# Patient Record
Sex: MALE | Race: WHITE | NOT HISPANIC OR LATINO | Employment: FULL TIME | URBAN - METROPOLITAN AREA
[De-identification: names, ages, dates, MRNs, and addresses within clinical notes are randomized per-mention and may not be internally consistent; named-entity substitution may affect disease eponyms.]

---

## 2017-12-07 RX ORDER — PSEUDOEPHEDRINE HCL 120 MG/1
120 TABLET, FILM COATED, EXTENDED RELEASE ORAL 2 TIMES DAILY
Qty: 60 TABLET | Refills: 1 | Status: CANCELLED | OUTPATIENT
Start: 2017-12-07

## 2018-03-08 RX ORDER — PSEUDOEPHEDRINE HCL 120 MG/1
120 TABLET, FILM COATED, EXTENDED RELEASE ORAL
Qty: 60 TABLET | Refills: 1 | Status: CANCELLED | OUTPATIENT
Start: 2018-03-01

## 2018-03-21 RX ORDER — PSEUDOEPHEDRINE HCL 120 MG/1
120 TABLET, FILM COATED, EXTENDED RELEASE ORAL
Qty: 60 TABLET | Refills: 1 | Status: CANCELLED | OUTPATIENT
Start: 2018-03-01

## 2018-04-05 ENCOUNTER — TRANSCRIBE ORDERS (OUTPATIENT)
Dept: ADMINISTRATIVE | Facility: HOSPITAL | Age: 43
End: 2018-04-05

## 2018-04-05 DIAGNOSIS — G89.29 CHRONIC LEFT SHOULDER PAIN: Primary | ICD-10-CM

## 2018-04-05 DIAGNOSIS — M25.512 CHRONIC LEFT SHOULDER PAIN: Primary | ICD-10-CM

## 2018-04-11 ENCOUNTER — HOSPITAL ENCOUNTER (OUTPATIENT)
Dept: MRI IMAGING | Facility: HOSPITAL | Age: 43
Discharge: HOME | End: 2018-04-11
Admitting: ORTHOPAEDIC SURGERY

## 2018-04-11 DIAGNOSIS — G89.29 CHRONIC LEFT SHOULDER PAIN: ICD-10-CM

## 2018-04-11 DIAGNOSIS — M25.512 CHRONIC LEFT SHOULDER PAIN: ICD-10-CM

## 2018-04-11 PROCEDURE — 73221 MRI JOINT UPR EXTREM W/O DYE: CPT

## 2018-06-27 RX ORDER — PSEUDOEPHEDRINE HCL 120 MG/1
120 TABLET, FILM COATED, EXTENDED RELEASE ORAL
Qty: 60 TABLET | Refills: 1 | Status: CANCELLED | OUTPATIENT
Start: 2018-06-27

## 2018-07-06 RX ORDER — PSEUDOEPHEDRINE HCL 120 MG/1
120 TABLET, FILM COATED, EXTENDED RELEASE ORAL
Qty: 60 TABLET | Refills: 1 | Status: CANCELLED | OUTPATIENT
Start: 2018-06-27

## 2018-07-31 RX ORDER — LISINOPRIL 20 MG/1
20 TABLET ORAL EVERY MORNING
Qty: 90 TABLET | Refills: 0 | Status: CANCELLED | OUTPATIENT
Start: 2018-07-31

## 2018-08-06 RX ORDER — PSEUDOEPHEDRINE HCL 120 MG/1
120 TABLET, FILM COATED, EXTENDED RELEASE ORAL
Qty: 60 TABLET | Refills: 1 | Status: CANCELLED | OUTPATIENT
Start: 2018-06-27

## 2018-08-06 RX ORDER — LISINOPRIL 20 MG/1
20 TABLET ORAL EVERY MORNING
Qty: 90 TABLET | Refills: 0 | Status: CANCELLED | OUTPATIENT
Start: 2018-07-31

## 2018-12-17 ENCOUNTER — OFFICE VISIT (OUTPATIENT)
Dept: INTERNAL MEDICINE | Facility: CLINIC | Age: 43
End: 2018-12-17

## 2018-12-17 VITALS
BODY MASS INDEX: 38.92 KG/M2 | WEIGHT: 248 LBS | HEIGHT: 67 IN | HEART RATE: 99 BPM | TEMPERATURE: 99.2 F | SYSTOLIC BLOOD PRESSURE: 108 MMHG | DIASTOLIC BLOOD PRESSURE: 62 MMHG | OXYGEN SATURATION: 99 %

## 2018-12-17 DIAGNOSIS — E29.1 HYPOGONADISM IN MALE: ICD-10-CM

## 2018-12-17 DIAGNOSIS — E66.01 MORBID OBESITY (HCC): ICD-10-CM

## 2018-12-17 DIAGNOSIS — I10 ESSENTIAL HYPERTENSION: ICD-10-CM

## 2018-12-17 DIAGNOSIS — Z00.00 HEALTHCARE MAINTENANCE: Primary | ICD-10-CM

## 2018-12-17 DIAGNOSIS — E55.9 VITAMIN D DEFICIENCY: ICD-10-CM

## 2018-12-17 DIAGNOSIS — Z72.51 HIGH RISK HETEROSEXUAL BEHAVIOR: ICD-10-CM

## 2018-12-17 DIAGNOSIS — R73.03 PRE-DIABETES: ICD-10-CM

## 2018-12-17 DIAGNOSIS — M54.50 CHRONIC LOW BACK PAIN WITHOUT SCIATICA, UNSPECIFIED BACK PAIN LATERALITY: ICD-10-CM

## 2018-12-17 DIAGNOSIS — Z91.09 ENVIRONMENTAL ALLERGIES: ICD-10-CM

## 2018-12-17 DIAGNOSIS — G89.29 CHRONIC LOW BACK PAIN WITHOUT SCIATICA, UNSPECIFIED BACK PAIN LATERALITY: ICD-10-CM

## 2018-12-17 DIAGNOSIS — N52.1 ERECTILE DYSFUNCTION DUE TO DISEASES CLASSIFIED ELSEWHERE: ICD-10-CM

## 2018-12-17 PROBLEM — S43.432A SUPERIOR GLENOID LABRUM LESION OF LEFT SHOULDER: Status: RESOLVED | Noted: 2018-04-12 | Resolved: 2018-12-17

## 2018-12-17 PROBLEM — M54.9 CHRONIC BACK PAIN: Status: ACTIVE | Noted: 2018-06-07

## 2018-12-17 PROBLEM — S43.432A SUPERIOR GLENOID LABRUM LESION OF LEFT SHOULDER: Status: ACTIVE | Noted: 2018-04-12

## 2018-12-17 PROCEDURE — 90715 TDAP VACCINE 7 YRS/> IM: CPT | Performed by: INTERNAL MEDICINE

## 2018-12-17 PROCEDURE — 93000 ELECTROCARDIOGRAM COMPLETE: CPT | Performed by: INTERNAL MEDICINE

## 2018-12-17 PROCEDURE — 99386 PREV VISIT NEW AGE 40-64: CPT | Performed by: INTERNAL MEDICINE

## 2018-12-17 PROCEDURE — 90471 IMMUNIZATION ADMIN: CPT | Performed by: INTERNAL MEDICINE

## 2018-12-17 NOTE — PROGRESS NOTES
No chief complaint on file.      HPI  Chris Thomas Jr. is a 43 y.o. male RTC in acute care:       ROS    {Common H&P Review Areas:65874}      Current Outpatient Medications:   •  cyclobenzaprine (FLEXERIL) 10 MG tablet, Take 1 tablet by mouth every 8 (eight) hours., Disp: 90 tablet, Rfl: 1  •  cyclobenzaprine (FLEXERIL) 10 MG tablet, Take 1 tablet by mouth 3 (Three) Times a Day., Disp: 270 tablet, Rfl: 0  •  cyclobenzaprine (FLEXERIL) 10 MG tablet, Take 1 tablet(s) twice a day by oral route before meals for 90 days., Disp: 90 tablet, Rfl: 5  •  hepatitis A (HAVRIX) 1440 EL U/ML vaccine, Inject  into the shoulder, thigh, or buttocks., Disp: 1 mL, Rfl: 0  •  Insulin Pen Needle 32G X 4 MM misc, Use as directed for victoza, Disp: 30 each, Rfl: 11  •  Liraglutide (VICTOZA) 18 MG/3ML solution pen-injector injection, Inject subcutaneously 0.6mg on 1st week, then 1.2mg on second week, and 1.8mg thereafter, Disp: 9 mL, Rfl: 1  •  Liraglutide (VICTOZA) 18 MG/3ML solution pen-injector injection, Inject 1.8 mg under the skin Daily., Disp: 9 mL, Rfl: 3  •  Liraglutide -Weight Management 18 MG/3ML solution pen-injector, Start by injecting 0.6mg for 1 week then increase by 0.6mgs every week until injecting 3mg daily, Disp: 15 mL, Rfl: 12  •  lisinopril (PRINIVIL,ZESTRIL) 20 MG tablet, Take 1 tablet by mouth Every Morning., Disp: 90 tablet, Rfl: 0  •  lisinopril (PRINIVIL,ZESTRIL) 20 MG tablet, Take 1 tablet(s) every day by oral route in the morning for 90 days., Disp: 90 tablet, Rfl: 1  •  meloxicam (MOBIC) 15 MG tablet, Take 1 tablet by mouth daily, Disp: 90 tablet, Rfl: 12  •  metFORMIN ER (GLUCOPHAGE-XR) 500 MG 24 hr tablet, Take 1 tablet(s) twice a day by oral route with meals for 90 days., Disp: 180 tablet, Rfl: 1  •  pseudoephedrine (SUDAFED 12 HOUR) 120 MG 12 hr tablet, Take 1 tablet by mouth 2 (Two) Times a Day., Disp: 60 tablet, Rfl: 1  •  pseudoephedrine (SUDAFED 12 HOUR) 120 MG 12 hr tablet, Take 1 tablet by mouth  Every 12 (Twelve) Hours with meals., Disp: 60 tablet, Rfl: 1  •  sildenafil (REVATIO) 20 MG tablet, Take 1-5 tablets by mouth As Needed., Disp: 90 tablet, Rfl: 2  •  tadalafil (CIALIS) 5 MG tablet, Take 1 tablet by mouth Daily., Disp: 30 tablet, Rfl: 6  •  testosterone (ANDROGEL) 50 MG/5GM (1%) gel gel, Place  on the skin as directed by provider. ( 1 packet every am), Disp: 150 g, Rfl: 5  •  Testosterone 20.25 MG/ACT (1.62%) gel, Place 2 Pump on the skin as directed by provider Daily., Disp: 150 g, Rfl: 5  •  vitamin D (ERGOCALCIFEROL) 83170 units capsule capsule, Take 1 capsule by mouth 1 (One) Time Per Week., Disp: 90 capsule, Rfl: 0    There were no vitals filed for this visit.      Physical Exam    Assessment/ Plan  There are no diagnoses linked to this encounter.    No Follow-up on file.      Discussion:

## 2018-12-17 NOTE — PROGRESS NOTES
"Establish Care (New Pt ) and Annual Exam (CPE )      HPI  Chris Thomas Jr. is a 43 y.o. male presents to establish, new pt CPE: Transfer from Dr. Vee  1. SARAH - dx'd in past.  Had T&A and soft palattectomy in 2005 and had negative test after surgeyr.   2. OA in L knee - had long term issues.  No meds used for knee.   3. L shoulder injury with rotator cuff \"abrasion\" - in Spring of 2018.  Placed on Meloxicam and uses PRN only.  Did some exercises and never went to formal PT. Had MRI at [place].  Home exercises really did work for pt.    4. HTN - dx'd in ~2003.  Takes med daily. Checks occasionally at home and gets good numbers 110's/ 60-70's.   5. Pre-DM/ Obesity - denies dx of DMII.  Placed on Victoza for weight loss and \"metformin was prior auth requirement\" for insurance. Has had slow, gradual weight loss. Has been going to the gym a lot \"more\" lately.  Has been stressed with divorce.  Has been eating more at home and cooking, but mother is cooking mostly. Eats fruits and vegetables, but less fruit than should. Never seen a dietician.   6. Hypogonadism - dx'd by Dr. Vee.  Pt was sx'ic with fatigue, lack of desire, low sex drive.  Has been on testosterone for last 4-5 months. Does not have a copy of those labs. Had checked fasting AM labs, but not sure of specific numbers checked.   7. ED - \"not as much lately\" but had issues in the past.  Thinks that divorce was part of issue.  Maintanence is biggest issue. Does get AM erections but not what used to get.   8. Saccral fx - ~2004, had malalignment issues with healing. Has intermittent pain issues and will take Meloxicam and flexeril for that issue PRN as well.   9. Environmental Allergies - \"I have terrible allergies\".  Tested as child and was never on shots, \"was not severe enough to be on serum\". Will use cetirizine daily and uses PRN sudaphed 3-4x/ week.  Noted tolerance to taste of Flonase was issue with taste.   Never tried other meds besides that.  Used " "Xyzal as well and noted it worked better than cetirizine, but was very expensive.   10. Vitamin D deficiency - is on once weekly, \"he never told why he put me on that\". Has been on this for last 1-2 years.         Review of Systems   Constitution: Positive for weight loss (gradual). Negative for chills, fever and malaise/fatigue.   HENT: Negative for congestion, hearing loss, odynophagia and sore throat.    Eyes: Negative for discharge, double vision, pain and redness.        Last eye exam ~2016; wears contacts     Cardiovascular: Negative for chest pain, dyspnea on exertion, irregular heartbeat, leg swelling, near-syncope, palpitations and syncope.   Respiratory: Negative for cough, shortness of breath, sleep disturbances due to breathing (resolved SARAH after surgery) and snoring (rare afer [facility] only).    Endocrine: Negative for polydipsia, polyphagia and polyuria.   Hematologic/Lymphatic: Negative for bleeding problem. Does not bruise/bleed easily.   Skin: Negative for rash and suspicious lesions.   Musculoskeletal: Positive for joint pain (L shoulder at times, mild). Negative for joint swelling, muscle cramps, muscle weakness and myalgias.   Gastrointestinal: Negative for constipation, diarrhea, dysphagia, heartburn, nausea and vomiting.   Genitourinary: Negative for dysuria, frequency, genital sores and hematuria.   Neurological: Negative for dizziness, headaches and light-headedness.   Psychiatric/Behavioral: Negative for depression. The patient is nervous/anxious (mild divorce realted anxiety, feels like handling OK ).    Allergic/Immunologic: Positive for environmental allergies. Negative for persistent infections.       Problem List:    Patient Active Problem List   Diagnosis   • Essential hypertension   • Chronic back pain   • Morbid obesity (CMS/HCC)   • Erectile dysfunction   • Vitamin D deficiency   • Environmental allergies       Medical History:    Past Medical History:   Diagnosis Date   • " Environmental allergies     tested in youth, no prior immunotherapy   • Erectile dysfunction    • Hypertension    • SARAH (obstructive sleep apnea)     resolved after uvulectomy and T & A in 2005   • Primary osteoarthritis of left knee    • Vitamin D deficiency         Social History:    Social History     Socioeconomic History   • Marital status:      Spouse name: Not on file   • Number of children: Not on file   • Years of education: Not on file   • Highest education level: Not on file   Social Needs   • Financial resource strain: Not on file   • Food insecurity - worry: Not on file   • Food insecurity - inability: Not on file   • Transportation needs - medical: Not on file   • Transportation needs - non-medical: Not on file   Occupational History   • Occupation: Pharmacy Tech     Employer: [employer]   Tobacco Use   • Smoking status: Never Smoker   • Smokeless tobacco: Never Used   Substance and Sexual Activity   • Alcohol use: Yes     Comment: 1-2 drinks/ weekly   • Drug use: No   • Sexual activity: Yes     Partners: Female     Comment: new partner, female; intermittent condoms; no STD's that aware of   Other Topics Concern   • Not on file   Social History Narrative    Diet - overall healthy, vegetables > fruit    Exercise - gym 3x/ week cardio, minimal weight/ resistance    Caffeine - off coffee; one diet soda daily       Family History:   Family History   Problem Relation Age of Onset   • Colon cancer Father         dx'd at 62   • Hypertension Father    • Alcohol abuse Maternal Uncle    • Stroke Maternal Grandfather    • Hypertension Mother    • Hypertension Sister    • Diabetes Maternal Grandmother    • Stroke Paternal Grandfather    • Lung disease Neg Hx        Surgical History:   Past Surgical History:   Procedure Laterality Date   • TONSILLECTOMY AND ADENOIDECTOMY  2005    Dr. Martinez, done for sleep apnea   • UVULECTOMY  2005    for SARAH         Current Outpatient Medications:   •   cyclobenzaprine (FLEXERIL) 10 MG tablet, Take 1 tablet(s) twice a day by oral route before meals for 90 days., Disp: 90 tablet, Rfl: 5  •  Insulin Pen Needle 32G X 4 MM misc, Use as directed for victoza, Disp: 30 each, Rfl: 11  •  Liraglutide (VICTOZA) 18 MG/3ML solution pen-injector injection, Inject 1.8 mg under the skin Daily., Disp: 9 mL, Rfl: 3  •  lisinopril (PRINIVIL,ZESTRIL) 20 MG tablet, Take 1 tablet(s) every day by oral route in the morning for 90 days., Disp: 90 tablet, Rfl: 1  •  meloxicam (MOBIC) 15 MG tablet, Take 1 tablet by mouth daily, Disp: 90 tablet, Rfl: 12  •  metFORMIN ER (GLUCOPHAGE-XR) 500 MG 24 hr tablet, Take 1 tablet(s) twice a day by oral route with meals for 90 days., Disp: 180 tablet, Rfl: 1  •  sildenafil (REVATIO) 20 MG tablet, Take 1-5 tablets by mouth As Needed., Disp: 90 tablet, Rfl: 2  •  vitamin D (ERGOCALCIFEROL) 89104 units capsule capsule, Take 1 capsule by mouth 1 (One) Time Per Week., Disp: 90 capsule, Rfl: 0    Vitals:    12/17/18 1529   BP: 108/62   Pulse: 99   Temp: 99.2 °F (37.3 °C)   SpO2: 99%       Physical Exam   Constitutional: He is oriented to person, place, and time. He appears well-developed and well-nourished. He is cooperative. No distress.   Obese habitus     HENT:   Head: Normocephalic and atraumatic.   Right Ear: Hearing, tympanic membrane, external ear and ear canal normal.   Left Ear: Hearing, tympanic membrane, external ear and ear canal normal.   Nose: Nose normal. No nasal deformity.  No foreign bodies.   Mouth/Throat: Uvula is midline, oropharynx is clear and moist and mucous membranes are normal. No oral lesions. No oropharyngeal exudate, posterior oropharyngeal edema or posterior oropharyngeal erythema.   Eyes: Conjunctivae, EOM and lids are normal. Pupils are equal, round, and reactive to light. Right eye exhibits no discharge. Left eye exhibits no discharge. No scleral icterus.   Neck: Normal range of motion and full passive range of motion  without pain. Neck supple. Carotid bruit is not present. No thyroid mass and no thyromegaly present.   Cardiovascular: Normal rate, regular rhythm, S1 normal, S2 normal and normal heart sounds. Exam reveals no gallop and no friction rub.   No murmur heard.  Pulses:       Radial pulses are 2+ on the right side, and 2+ on the left side.        Dorsalis pedis pulses are 2+ on the right side, and 2+ on the left side.        Posterior tibial pulses are 2+ on the right side, and 2+ on the left side.   Pulmonary/Chest: Effort normal and breath sounds normal. No respiratory distress. He has no wheezes. He has no rhonchi. He has no rales.   Abdominal: Soft. Bowel sounds are normal. He exhibits no distension and no mass. There is no hepatosplenomegaly. There is no tenderness. There is no rebound and no guarding.   Genitourinary: Rectum normal and prostate normal. Prostate is not enlarged and not tender.   Musculoskeletal: Normal range of motion. He exhibits no edema.        Right shoulder: He exhibits normal range of motion, no tenderness and no bony tenderness.        Left shoulder: He exhibits normal range of motion, no tenderness and no bony tenderness.   Muscular habitus     Vascular Status -  His right foot exhibits normal foot vasculature  and no edema. His left foot exhibits normal foot vasculature  and no edema.  Skin Integrity  -  His right foot skin is intact.His left foot skin is intact..  Lymphadenopathy:     He has no cervical adenopathy.     He has no axillary adenopathy.        Right: No inguinal adenopathy present.        Left: No inguinal adenopathy present.   Neurological: He is alert and oriented to person, place, and time. He has normal strength and normal reflexes. He displays no tremor. No cranial nerve deficit or sensory deficit. He exhibits normal muscle tone. Gait normal.   Skin: Skin is warm, dry and intact. No rash noted.        Full facial and chest hair.    Psychiatric: He has a normal mood and  affect. His speech is normal and behavior is normal. Thought content normal. Cognition and memory are normal.   Vitals reviewed.      ECG 12 Lead  Date/Time: 12/17/2018 5:38 PM  Performed by: Casey Cheema MD  Authorized by: Casey Cheema MD   Comparison: not compared with previous ECG   Rhythm: sinus rhythm  Rate: normal  BPM: 72  ST Segments: ST segments normal  T Waves: T waves normal  QRS axis: normal  Clinical impression: normal ECG  Comments: QTc - 396  Indication - new pt CPE            Assessment/ Plan  Diagnoses and all orders for this visit:    Healthcare maintenance  -     Tdap Vaccine Greater Than or Equal To 8yo IM  -     CBC & Differential  -     Comprehensive Metabolic Panel  -     Hemoglobin A1c  -     Hepatitis B Core Antibody, Total  -     Hepatitis B Surface Antibody  -     Hepatitis B Surface Antigen  -     HIV-1 / O / 2 Ag / Antibody 4th Generation  -     Microalbumin / Creatinine Urine Ratio - Urine, Clean Catch  -     TestT+TestF+SHBG  -     FSH & LH  -     TSH  -     UA / M With / Rflx Culture([facility] ONLY) - Urine, Clean Catch  -     Vitamin D 25 Hydroxy  -     Vitamin B12  -     Folate  -     Chlamydia trachomatis, Neisseria gonorrhoeae, PCR - Swab, Urine, Clean Catch  -     RPR    Essential hypertension  -     Comprehensive Metabolic Panel    Chronic low back pain without sciatica, unspecified back pain laterality    Morbid obesity (CMS/HCC)  -     Comprehensive Metabolic Panel  -     Hemoglobin A1c    Erectile dysfunction due to diseases classified elsewhere  -     TestT+TestF+SHBG  -     FSH & LH  -     TSH  -     Vitamin B12  -     Folate    Vitamin D deficiency  -     Vitamin D 25 Hydroxy    Environmental allergies    Pre-diabetes  -     Comprehensive Metabolic Panel  -     Hemoglobin A1c  -     TSH    Hypogonadism in male  -     TestT+TestF+SHBG  -     FSH & LH  -     TSH    High risk heterosexual behavior  -     Hepatitis B Core Antibody, Total  -     Hepatitis B Surface  "Antibody  -     Hepatitis B Surface Antigen  -     HIV-1 / O / 2 Ag / Antibody 4th Generation  -     Chlamydia trachomatis, Neisseria gonorrhoeae, PCR - Swab, Urine, Clean Catch  -     RPR        Return in about 2 weeks (around 12/31/2018).      Discussion:  Chris Thomas Jr. is a 43 y.o. male presents to establish, new pt CPE: Transfer from Dr. Vee  1. SARAH - dx'd in past, resolved on f/u sleep study after T&A and soft palattectomy in 2005.  2. OA in L knee - had long term issues.  No limitations. Monitor.   3. L shoulder injury with rotator cuff \"abrasion\" - in Spring of 2018, pt denies labral tear in shoulder as per chart.   Uses PRN Meloxicam with minimal pain issues. Non-surgical case per pt.   4. HTN - dx'd in ~2003.  Controlled on one drug. Check BMP.   5. Pre-DM/ Obesity - denies dx of DMII.  On Victoza and Metformin for 'weight loss'.  I am not in favor of this isolated indication, but will c/w meds for now until I see FBS and A1C numbers.  Would like to focus more on diet and exercise for weight loss and for DM II prevention.   6. Hypogonadism - new dx, pt on topical testosterone. I am not convinced of this dx as pt has full facial hair, chest hair, and muscular, albeit  Obese, habitus.  I would like pt to stop this med ASAP and well check baseline numbers next week, though likely will need to recheck in 2-3 months after testicular production has been given chance to recover.  PT agreeable to this plan.   7. ED - improved after divorce and feels largely psychogenic. Weight loss advised.  OK for PDE-5 PRN.   8. Saccral fx - ~2004, occasional low back pain. MAREK.  9. Environmental Allergies - long hx, testing in [facility]. No prior immunotherapy. D/C Sudaphed.  C/W cetirizine and add OTC Flonase Sensimist for tolerance issues and see how he does. Counseled.    10. Vitamin D deficiency - on high dose weekly for years?? Check levels on labs, adjust as needed.   11. HIgh risk sexual behavior - new female partner. "  Advised for condom use consistently.  Check HIV, RPR, U G/C.   12. HM - check labs; Flu/ Hep A- UTD; Tdap - today; Hep B check titers; C-scope age 47; ELIDIA OK; add more exercise.     RTC 2 weeks; next available fasting labs.

## 2018-12-21 LAB
25(OH)D3+25(OH)D2 SERPL-MCNC: 39.3 NG/ML (ref 30–100)
ALBUMIN SERPL-MCNC: 4.3 G/DL (ref 3.5–5.2)
ALBUMIN/CREAT UR: <3.3 MG/G CREAT (ref 0–30)
ALBUMIN/GLOB SERPL: 1.7 G/DL
ALP SERPL-CCNC: 73 U/L (ref 39–117)
ALT SERPL-CCNC: 28 U/L (ref 1–41)
APPEARANCE UR: CLEAR
AST SERPL-CCNC: 21 U/L (ref 1–40)
BACTERIA #/AREA URNS HPF: NORMAL /HPF
BASOPHILS # BLD AUTO: 0.01 10*3/MM3 (ref 0–0.2)
BASOPHILS NFR BLD AUTO: 0.1 % (ref 0–1.5)
BILIRUB SERPL-MCNC: 0.5 MG/DL (ref 0.1–1.2)
BILIRUB UR QL STRIP: NEGATIVE
BUN SERPL-MCNC: 13 MG/DL (ref 6–20)
BUN/CREAT SERPL: 12.7 (ref 7–25)
C TRACH RRNA SPEC QL NAA+PROBE: NEGATIVE
CALCIUM SERPL-MCNC: 9.3 MG/DL (ref 8.6–10.5)
CHLORIDE SERPL-SCNC: 103 MMOL/L (ref 98–107)
CO2 SERPL-SCNC: 26.5 MMOL/L (ref 22–29)
COLOR UR: YELLOW
CREAT SERPL-MCNC: 1.02 MG/DL (ref 0.76–1.27)
CREAT UR-MCNC: 90.3 MG/DL
EOSINOPHIL # BLD AUTO: 0.06 10*3/MM3 (ref 0–0.7)
EOSINOPHIL NFR BLD AUTO: 0.7 % (ref 0.3–6.2)
EPI CELLS #/AREA URNS HPF: NORMAL /HPF
ERYTHROCYTE [DISTWIDTH] IN BLOOD BY AUTOMATED COUNT: 13.4 % (ref 11.5–14.5)
FOLATE SERPL-MCNC: >20 NG/ML (ref 4.78–24.2)
FSH SERPL-ACNC: 4.1 MIU/ML (ref 1.5–12.4)
GLOBULIN SER CALC-MCNC: 2.6 GM/DL
GLUCOSE SERPL-MCNC: 99 MG/DL (ref 65–99)
GLUCOSE UR QL: NEGATIVE
HBA1C MFR BLD: 5.1 % (ref 4.8–5.6)
HBV CORE AB SERPL QL IA: NEGATIVE
HBV SURFACE AB SER QL: NON REACTIVE
HBV SURFACE AG SERPL QL IA: NEGATIVE
HCT VFR BLD AUTO: 44.9 % (ref 40.4–52.2)
HGB BLD-MCNC: 15.1 G/DL (ref 13.7–17.6)
HGB UR QL STRIP: NEGATIVE
HIV 1+2 AB+HIV1 P24 AG SERPL QL IA: NON REACTIVE
IMM GRANULOCYTES # BLD: 0.03 10*3/MM3 (ref 0–0.03)
IMM GRANULOCYTES NFR BLD: 0.3 % (ref 0–0.5)
KETONES UR QL STRIP: NEGATIVE
LEUKOCYTE ESTERASE UR QL STRIP: NEGATIVE
LH SERPL-ACNC: 3.8 MIU/ML (ref 1.7–8.6)
LYMPHOCYTES # BLD AUTO: 2.01 10*3/MM3 (ref 0.9–4.8)
LYMPHOCYTES NFR BLD AUTO: 23.3 % (ref 19.6–45.3)
MCH RBC QN AUTO: 29.5 PG (ref 27–32.7)
MCHC RBC AUTO-ENTMCNC: 33.6 G/DL (ref 32.6–36.4)
MCV RBC AUTO: 87.7 FL (ref 79.8–96.2)
MICRO URNS: NORMAL
MICRO URNS: NORMAL
MICROALBUMIN UR-MCNC: <3 UG/ML
MONOCYTES # BLD AUTO: 0.51 10*3/MM3 (ref 0.2–1.2)
MONOCYTES NFR BLD AUTO: 5.9 % (ref 5–12)
MUCOUS THREADS URNS QL MICRO: PRESENT /HPF
N GONORRHOEA RRNA SPEC QL NAA+PROBE: NEGATIVE
NEUTROPHILS # BLD AUTO: 6.05 10*3/MM3 (ref 1.9–8.1)
NEUTROPHILS NFR BLD AUTO: 70 % (ref 42.7–76)
NITRITE UR QL STRIP: NEGATIVE
PH UR STRIP: 6.5 [PH] (ref 5–7.5)
PLATELET # BLD AUTO: 208 10*3/MM3 (ref 140–500)
POTASSIUM SERPL-SCNC: 4.2 MMOL/L (ref 3.5–5.2)
PROT SERPL-MCNC: 6.9 G/DL (ref 6–8.5)
PROT UR QL STRIP: NEGATIVE
RBC # BLD AUTO: 5.12 10*6/MM3 (ref 4.6–6)
RBC #/AREA URNS HPF: NORMAL /HPF
RPR SER QL: NORMAL
SHBG SERPL-SCNC: 19.5 NMOL/L (ref 16.5–55.9)
SODIUM SERPL-SCNC: 139 MMOL/L (ref 136–145)
SP GR UR: 1.02 (ref 1–1.03)
TESTOST FREE SERPL-MCNC: 9.5 PG/ML (ref 6.8–21.5)
TESTOST SERPL-MCNC: 293 NG/DL (ref 264–916)
TSH SERPL DL<=0.005 MIU/L-ACNC: 3.49 MIU/ML (ref 0.27–4.2)
URINALYSIS REFLEX: NORMAL
UROBILINOGEN UR STRIP-MCNC: 0.2 MG/DL (ref 0.2–1)
VIT B12 SERPL-MCNC: 699 PG/ML (ref 211–946)
WBC # BLD AUTO: 8.64 10*3/MM3 (ref 4.5–10.7)
WBC #/AREA URNS HPF: NORMAL /HPF

## 2018-12-24 ENCOUNTER — TELEPHONE (OUTPATIENT)
Dept: INTERNAL MEDICINE | Facility: CLINIC | Age: 43
End: 2018-12-24

## 2018-12-24 NOTE — TELEPHONE ENCOUNTER
----- Message from Casey Cheema MD sent at 12/22/2018 10:12 AM EST -----  Results called to pt 12/24/18.  Labs overall look good, nothing emergent.   Will d/w pt all results in detail on 1/4/19 at visit.

## 2018-12-27 ENCOUNTER — OFFICE VISIT (OUTPATIENT)
Dept: INTERNAL MEDICINE | Facility: CLINIC | Age: 43
End: 2018-12-27

## 2018-12-27 VITALS
DIASTOLIC BLOOD PRESSURE: 62 MMHG | SYSTOLIC BLOOD PRESSURE: 112 MMHG | WEIGHT: 250 LBS | BODY MASS INDEX: 39.24 KG/M2 | TEMPERATURE: 99.7 F | OXYGEN SATURATION: 98 % | HEART RATE: 103 BPM | HEIGHT: 67 IN

## 2018-12-27 DIAGNOSIS — J01.90 ACUTE SINUSITIS, RECURRENCE NOT SPECIFIED, UNSPECIFIED LOCATION: ICD-10-CM

## 2018-12-27 DIAGNOSIS — J02.9 ACUTE PHARYNGITIS, UNSPECIFIED ETIOLOGY: Primary | ICD-10-CM

## 2018-12-27 DIAGNOSIS — R53.83 FATIGUE, UNSPECIFIED TYPE: ICD-10-CM

## 2018-12-27 LAB
EXPIRATION DATE: NORMAL
FLUAV AG NPH QL: NORMAL
FLUBV AG NPH QL: NORMAL
INTERNAL CONTROL: NORMAL
Lab: NORMAL

## 2018-12-27 PROCEDURE — 99214 OFFICE O/P EST MOD 30 MIN: CPT | Performed by: INTERNAL MEDICINE

## 2018-12-27 PROCEDURE — 87804 INFLUENZA ASSAY W/OPTIC: CPT | Performed by: INTERNAL MEDICINE

## 2018-12-27 RX ORDER — SOD CHLOR,BICARB/SQUEEZ BOTTLE
1 PACKET, WITH RINSE DEVICE NASAL 3 TIMES DAILY
Qty: 1 EACH | Refills: 0
Start: 2018-12-27 | End: 2020-01-16

## 2018-12-27 RX ORDER — IPRATROPIUM BROMIDE 21 UG/1
2 SPRAY, METERED NASAL EVERY 12 HOURS PRN
Qty: 30 ML | Refills: 0 | Status: SHIPPED | OUTPATIENT
Start: 2018-12-27 | End: 2019-06-03

## 2018-12-27 RX ORDER — FLUTICASONE PROPIONATE 50 MCG
2 SPRAY, SUSPENSION (ML) NASAL DAILY
Start: 2018-12-27 | End: 2019-01-26

## 2018-12-27 NOTE — PROGRESS NOTES
"Sinusitis; Ear Fullness; and Headache      HPI  Chris Thomas Jr. is a 43 y.o. male RTC In acute care:  Stated last PM with nasal congestion and sore throat.  Had temp to 100.5 this AM.  Minimal cough, more due to post nasal gtt.  Feels congested in sinuses, 'oh very'.  Getting some dark colored mucous from nose.  Mild body aches, \"overall achy all over\".Has blistering HA. Ears are full.  Feels run down and exhausted.   Yesterday AM felt OK, really hit pt yesterday evening.   Meds: OTC Sudpahed this AM.  No other meds used.  5AM took Aleve due to achiness and fever.        Review of Systems   Constitution: Positive for chills (last PM, \"a little bit\"/.), fever and malaise/fatigue.   HENT: Positive for congestion, ear pain, hoarse voice and sore throat. Negative for ear discharge and stridor.    Eyes: Negative for vision loss in left eye and vision loss in right eye.   Respiratory: Positive for cough (from post nasal gtt). Negative for shortness of breath and wheezing.    Musculoskeletal: Positive for myalgias (diffuse) and neck pain.   Gastrointestinal: Negative for abdominal pain, diarrhea, nausea and vomiting.   Neurological: Positive for headaches.       The following portions of the patient's history were reviewed and updated as appropriate: allergies, current medications, past medical history and problem list.      Current Outpatient Medications:   •  cyclobenzaprine (FLEXERIL) 10 MG tablet, Take 1 tablet(s) twice a day by oral route before meals for 90 days., Disp: 90 tablet, Rfl: 5  •  Insulin Pen Needle 32G X 4 MM misc, Use as directed for victoza, Disp: 30 each, Rfl: 11  •  Liraglutide (VICTOZA) 18 MG/3ML solution pen-injector injection, Inject 1.8 mg under the skin Daily., Disp: 9 mL, Rfl: 3  •  lisinopril (PRINIVIL,ZESTRIL) 20 MG tablet, Take 1 tablet(s) every day by oral route in the morning for 90 days., Disp: 90 tablet, Rfl: 1  •  meloxicam (MOBIC) 15 MG tablet, Take 1 tablet by mouth daily, Disp: 90 " "tablet, Rfl: 12  •  metFORMIN ER (GLUCOPHAGE-XR) 500 MG 24 hr tablet, Take 1 tablet(s) twice a day by oral route with meals for 90 days., Disp: 180 tablet, Rfl: 1  •  sildenafil (REVATIO) 20 MG tablet, Take 1-5 tablets by mouth As Needed., Disp: 90 tablet, Rfl: 2  •  vitamin D (ERGOCALCIFEROL) 43595 units capsule capsule, Take 1 capsule by mouth 1 (One) Time Per Week., Disp: 90 capsule, Rfl: 0  •  fluticasone (FLONASE) 50 MCG/ACT nasal spray, 2 sprays into the nostril(s) as directed by provider Daily for 30 days. Administer 2 sprays in each nostril daily, Disp: , Rfl:   •  Hypertonic Nasal Wash (SINUS RINSE BOTTLE KIT) pack, 1 bottle into the nostril(s) as directed by provider 3 (Three) Times a Day., Disp: 1 each, Rfl: 0  •  ipratropium (ATROVENT) 0.03 % nasal spray, 2 sprays into the nostril(s) as directed by provider Every 12 (Twelve) Hours As Needed for Rhinitis., Disp: 30 mL, Rfl: 0    Vitals:    12/27/18 1039   BP: 112/62   Pulse: 103   Temp: 99.7 °F (37.6 °C)   SpO2: 98%   Weight: 113 kg (250 lb)   Height: 170.2 cm (67.01\")         Physical Exam   Constitutional: He is oriented to person, place, and time. He appears well-developed and well-nourished. He does not have a sickly appearance. He does not appear ill. No distress.   HENT:   Head: Normocephalic and atraumatic.   Right Ear: Tympanic membrane, external ear and ear canal normal.   Left Ear: Tympanic membrane, external ear and ear canal normal.   Nose: Mucosal edema present. No rhinorrhea. Right sinus exhibits no maxillary sinus tenderness and no frontal sinus tenderness. Left sinus exhibits no maxillary sinus tenderness and no frontal sinus tenderness.   Mouth/Throat: Uvula is midline. Mucous membranes are not pale, not dry and not cyanotic. No oral lesions. Posterior oropharyngeal erythema (mild) present. No oropharyngeal exudate or posterior oropharyngeal edema.   Eyes: Conjunctivae are normal. Pupils are equal, round, and reactive to light. Right eye " exhibits no discharge. Left eye exhibits no discharge.   Neck: Normal range of motion. Neck supple.   Cardiovascular: Normal rate and regular rhythm.   Pulmonary/Chest: Effort normal and breath sounds normal. No respiratory distress. He has no wheezes. He has no rales.   Lymphadenopathy:     He has no cervical adenopathy.   Neurological: He is alert and oriented to person, place, and time. No cranial nerve deficit.   Psychiatric: He has a normal mood and affect. His behavior is normal.   Vitals reviewed.    Results for orders placed or performed in visit on 12/27/18   POC Influenza A / B   Result Value Ref Range    Rapid Influenza A Ag neg Negative    Rapid Influenza B Ag neg Negative    Internal Control Passed Passed    Lot Number [id number]     Expiration Date 06/30/2020        Assessment/ Plan  Diagnoses and all orders for this visit:    Acute pharyngitis, unspecified etiology  -     POC Influenza A / B  -     Hypertonic Nasal Wash (SINUS RINSE BOTTLE KIT) pack; 1 bottle into the nostril(s) as directed by provider 3 (Three) Times a Day.  -     fluticasone (FLONASE) 50 MCG/ACT nasal spray; 2 sprays into the nostril(s) as directed by provider Daily for 30 days. Administer 2 sprays in each nostril daily  -     ipratropium (ATROVENT) 0.03 % nasal spray; 2 sprays into the nostril(s) as directed by provider Every 12 (Twelve) Hours As Needed for Rhinitis.    Acute sinusitis, recurrence not specified, unspecified location  -     POC Influenza A / B  -     Hypertonic Nasal Wash (SINUS RINSE BOTTLE KIT) pack; 1 bottle into the nostril(s) as directed by provider 3 (Three) Times a Day.  -     fluticasone (FLONASE) 50 MCG/ACT nasal spray; 2 sprays into the nostril(s) as directed by provider Daily for 30 days. Administer 2 sprays in each nostril daily  -     ipratropium (ATROVENT) 0.03 % nasal spray; 2 sprays into the nostril(s) as directed by provider Every 12 (Twelve) Hours As Needed for Rhinitis.    Fatigue, unspecified  type  -     POC Influenza A / B  -     Hypertonic Nasal Wash (SINUS RINSE BOTTLE KIT) pack; 1 bottle into the nostril(s) as directed by provider 3 (Three) Times a Day.  -     fluticasone (FLONASE) 50 MCG/ACT nasal spray; 2 sprays into the nostril(s) as directed by provider Daily for 30 days. Administer 2 sprays in each nostril daily  -     ipratropium (ATROVENT) 0.03 % nasal spray; 2 sprays into the nostril(s) as directed by provider Every 12 (Twelve) Hours As Needed for Rhinitis.          Chris GIBSON William Winn is a 43 y.o. male RTC In acute care (new issue to examinter) with < 24 hours of acute sinusitis/ pharyngitis sx.  Suspect viral etiology today.  Flu swab negative in office. We reviewed cardinal signs of bacterial sinusitis and pt to monitor for development.  Tx regimen as noted above with focus on aggressive nasal toilet.  I did add Rx for ipratroprium nasal spray as pt desires aggressive tx of sinus stuffiness sx and I would like to keep pt off Sudaphed due to side effect/ blood pressure issues.  OK for use of home Meloxicam over next few days for anti-inflammatory effect.    RTC next week as planned for review of recent CPE labs. Pt to call or RTC if T> 100.5/ progressive fever curve, SOA, double sickness, or failure to improve.        Discussion:  Answers for HPI/ROS submitted by the patient on 12/27/2018   Sore throat  Chronicity: new  Onset: yesterday  Progression since onset: gradually worsening  Pain worse on: neither  Fever: 100 - 100.9 F  Fever duration: less than 1 day  Pain - numeric: 4/10  drooling: No  plugged ear sensation: Yes  swollen glands: No  trouble swallowing: Yes  strep: No  mono: No

## 2019-01-04 ENCOUNTER — OFFICE VISIT (OUTPATIENT)
Dept: INTERNAL MEDICINE | Facility: CLINIC | Age: 44
End: 2019-01-04

## 2019-01-04 VITALS
SYSTOLIC BLOOD PRESSURE: 120 MMHG | DIASTOLIC BLOOD PRESSURE: 70 MMHG | BODY MASS INDEX: 38.45 KG/M2 | WEIGHT: 245 LBS | OXYGEN SATURATION: 98 % | HEIGHT: 67 IN | TEMPERATURE: 99.6 F | HEART RATE: 91 BPM

## 2019-01-04 DIAGNOSIS — I10 ESSENTIAL (PRIMARY) HYPERTENSION: ICD-10-CM

## 2019-01-04 DIAGNOSIS — E55.9 VITAMIN D DEFICIENCY: ICD-10-CM

## 2019-01-04 DIAGNOSIS — Z91.09 ENVIRONMENTAL ALLERGIES: ICD-10-CM

## 2019-01-04 DIAGNOSIS — R05.9 COUGH: ICD-10-CM

## 2019-01-04 DIAGNOSIS — J20.9 ACUTE BRONCHITIS, UNSPECIFIED ORGANISM: Primary | ICD-10-CM

## 2019-01-04 DIAGNOSIS — N52.1 ERECTILE DYSFUNCTION DUE TO DISEASES CLASSIFIED ELSEWHERE: ICD-10-CM

## 2019-01-04 DIAGNOSIS — I10 ESSENTIAL HYPERTENSION: ICD-10-CM

## 2019-01-04 DIAGNOSIS — E66.01 MORBID OBESITY (HCC): ICD-10-CM

## 2019-01-04 DIAGNOSIS — R06.2 WHEEZING: ICD-10-CM

## 2019-01-04 PROCEDURE — 99214 OFFICE O/P EST MOD 30 MIN: CPT | Performed by: INTERNAL MEDICINE

## 2019-01-04 PROCEDURE — 94640 AIRWAY INHALATION TREATMENT: CPT | Performed by: INTERNAL MEDICINE

## 2019-01-04 RX ORDER — LISINOPRIL 20 MG/1
TABLET ORAL
Qty: 90 TABLET | Refills: 3 | Status: SHIPPED | OUTPATIENT
Start: 2019-01-04 | End: 2022-08-31

## 2019-01-04 RX ORDER — PROMETHAZINE HYDROCHLORIDE AND CODEINE PHOSPHATE 6.25; 1 MG/5ML; MG/5ML
5 SYRUP ORAL NIGHTLY PRN
Qty: 60 ML | Refills: 0 | Status: SHIPPED | OUTPATIENT
Start: 2019-01-04 | End: 2019-06-03

## 2019-01-04 RX ORDER — LEVALBUTEROL INHALATION SOLUTION 0.63 MG/3ML
0.63 SOLUTION RESPIRATORY (INHALATION) ONCE
Status: COMPLETED | OUTPATIENT
Start: 2019-01-04 | End: 2019-01-04

## 2019-01-04 RX ADMIN — LEVALBUTEROL INHALATION SOLUTION 0.63 MG: 0.63 SOLUTION RESPIRATORY (INHALATION) at 16:45

## 2019-01-04 NOTE — PROGRESS NOTES
Follow-up (new pt cpe )      HPI  Chris Thomas Jr. is a 43 y.o. male RTC In short interval f/u:   Has been dealing with nasal congestion and URI.  Had some issues with nasal rinse and ipratroprium at this point. Feels like not getting over it as much as wanted. Is not sleeping due to cough at night. Tmax was 99.4 despite some chills. Able to work, has not had to call in except for one day last week.Wants some meds for cough at night.  Is still taking Mucinex and drinking water a lot.  NO hx of asthma noted but feels like has been a bit wheezy recently at times.    Otherwise notes health has been as per recent CPE visit.   Is wondering if need to stay on 20mg of lisinopril or if can reduce dose. No home checks on blood pressure, but no low pressure sx.   Prefers not to be on more meds than needs to be, so is not committed to meds he is on for weight loss. Admits exercise is inconsistent and is the problem. Diet is overall good.       Review of Systems   Constitution: Positive for chills (intemittent) and malaise/fatigue. Negative for fever and weight loss.   HENT: Positive for congestion (improving ). Negative for sore throat.    Cardiovascular: Negative for chest pain, dyspnea on exertion, irregular heartbeat and palpitations.   Respiratory: Positive for cough, sputum production and wheezing (occasional). Negative for shortness of breath.    Neurological: Negative for dizziness and light-headedness.       The following portions of the patient's history were reviewed and updated as appropriate: allergies, current medications, past medical history and problem list.      Current Outpatient Medications:   •  cyclobenzaprine (FLEXERIL) 10 MG tablet, Take 1 tablet(s) twice a day by oral route before meals for 90 days., Disp: 90 tablet, Rfl: 5  •  fluticasone (FLONASE) 50 MCG/ACT nasal spray, 2 sprays into the nostril(s) as directed by provider Daily for 30 days. Administer 2 sprays in each nostril daily, Disp: , Rfl:  "  •  Hypertonic Nasal Wash (SINUS RINSE BOTTLE KIT) pack, 1 bottle into the nostril(s) as directed by provider 3 (Three) Times a Day., Disp: 1 each, Rfl: 0  •  Insulin Pen Needle 32G X 4 MM misc, Use as directed for victoza, Disp: 30 each, Rfl: 11  •  ipratropium (ATROVENT) 0.03 % nasal spray, 2 sprays into the nostril(s) as directed by provider Every 12 (Twelve) Hours As Needed for Rhinitis., Disp: 30 mL, Rfl: 0  •  lisinopril (PRINIVIL,ZESTRIL) 20 MG tablet, Take 1 tablet(s) every day by mouth in the morning for 90 days., Disp: 90 tablet, Rfl: 3  •  meloxicam (MOBIC) 15 MG tablet, Take 1 tablet by mouth daily, Disp: 90 tablet, Rfl: 12  •  sildenafil (REVATIO) 20 MG tablet, Take 1-5 tablets by mouth As Needed., Disp: 90 tablet, Rfl: 2  •  vitamin D (ERGOCALCIFEROL) 89955 units capsule capsule, Take 1 capsule by mouth 1 (One) Time Per Week., Disp: 90 capsule, Rfl: 0  •  promethazine-codeine (PHENERGAN with CODEINE) 6.25-10 MG/5ML syrup, Take 5 mL by mouth at night as needed for cough., Disp: 60 mL, Rfl: 0  No current facility-administered medications for this visit.     Vitals:    01/04/19 1533   BP: 120/70   Pulse: 91   Temp: 99.6 °F (37.6 °C)   SpO2: 98%   Weight: 111 kg (245 lb)   Height: 170.2 cm (67\")         Physical Exam   Constitutional: He is oriented to person, place, and time. He appears well-developed and well-nourished. No distress.   Obese habitus     HENT:   Head: Normocephalic and atraumatic.   Eyes: Pupils are equal, round, and reactive to light.   Neck: Carotid bruit is not present.   Cardiovascular: Normal rate, regular rhythm and normal heart sounds.   Pulses:       Carotid pulses are 2+ on the right side, and 2+ on the left side.       Radial pulses are 2+ on the right side, and 2+ on the left side.   Pulmonary/Chest: Effort normal. No tachypnea. No respiratory distress. He has no decreased breath sounds. He has wheezes (scattered, rare). He has no rhonchi. He has no rales.   Neurological: He is " alert and oriented to person, place, and time. No cranial nerve deficit. Gait normal.   Skin:   Thick facial hair noted.    Psychiatric: He has a normal mood and affect. His behavior is normal.   Vitals reviewed.      Assessment/ Plan  Diagnoses and all orders for this visit:    Acute bronchitis, unspecified organism  -     promethazine-codeine (PHENERGAN with CODEINE) 6.25-10 MG/5ML syrup; Take 5 mL by mouth at night as needed for cough.  -     levalbuterol (XOPENEX) nebulizer solution 0.63 mg; Take 3 mL by nebulization 1 (One) Time.    Essential (primary) hypertension  -     lisinopril (PRINIVIL,ZESTRIL) 20 MG tablet; Take 1 tablet(s) every day by mouth in the morning for 90 days.    Cough  -     promethazine-codeine (PHENERGAN with CODEINE) 6.25-10 MG/5ML syrup; Take 5 mL by mouth at night as needed for cough.  -     levalbuterol (XOPENEX) nebulizer solution 0.63 mg; Take 3 mL by nebulization 1 (One) Time.    Wheezing  -     levalbuterol (XOPENEX) nebulizer solution 0.63 mg; Take 3 mL by nebulization 1 (One) Time.    Environmental allergies    Essential hypertension    Morbid obesity (CMS/HCC)    Erectile dysfunction due to diseases classified elsewhere    Vitamin D deficiency        Return in about 5 months (around 6/4/2019) for Recheck.      Discussion:  Chris Thomas JrRamana is a 43 y.o. male RTC In short interval f/u after recent CPE:   1. Acute bronchitis and sinusitis - about 10 days into sx and is slowly improving but still dealing with cough and related insomnia and quite a bit of nasal mucous. Pt has been compliant with nasal rinsing and nasal sprays.  Exam today is notable for some mild scattered wheezes, new on exam. Peak flow normal in office today;  pt has no asthma hx despite atopic hx. Neb tx provided in office and cough syrup Rx for nighttime control.  I still suspect this is all viral in nature and pt is improving. Will call next week to f/u via phone and make sure pt is improving. Pt to track fever  curve and call if progressive or has any temp > 100.5.    2. HTN - controlled on one drug.  BMP OK. WIll hold on dose reduction and trend numbers next visit, hopefully with some weight loss.   3. Pre-DM/ Obesity - pn Victoza and Metformin for weight loss; A1C and FBS normal on labs and pt denies DM dx.  I d/w pt that I am not a fan of weight loss meds given cycle of weight loss and gain, particularly in face of less than adequate exercise. Will stop both meds today and see if pt able to reduce weight with exercise mods. Will trend A1C and FBS next labs as there is indication in pt age group for Metformin for DM II prevention, but would like to see A1C baseline first.  Pt agrees to plan.    4. Hypogonadism - new dx prior to new pt CPE her, s/p topical testosterone x 1 month.  Labs drawn one week off and show normal free testosterone/ LH/ FSH.  I do not suspect overy hypogonadism, particularly as pt does not look clinically hypogonad at all.  Will have pt remain of topical gel and will trend levels in 5 months to see how he is doing.    5. ED - improved after divorce and feels largely psychogenic. Weight loss advised.  OK for PDE-5 PRN.   6. Environmental Allergies - long hx, testing in youth. No prior immunotherapy.  C/W cetirizine and add OTC Flonase Sensimist for tolerance issues and see how he does. Counseled.    7. Vitamin D deficiency - on high dose weekly for years. Levels OK on labs, OK to c/w same.   8. HIgh risk sexual behavior - new female partner.  Advised for condom use consistently.  HIV, RPR, U G/C on labs.    9. HM -  Reviewed all labs with pt today;  Hep B check titers negative and pt will discuss vaccine series at employee health in [facility].     RTC 5 months, F labs prior (CMP, A1C, testosterone panel, LH/FSH, Vitamin D)

## 2019-03-13 RX ORDER — SILDENAFIL CITRATE 20 MG/1
20-100 TABLET ORAL AS NEEDED
Qty: 90 TABLET | Refills: 2 | Status: CANCELLED | OUTPATIENT
Start: 2019-03-13

## 2019-05-21 DIAGNOSIS — I10 ESSENTIAL HYPERTENSION: Primary | ICD-10-CM

## 2019-05-21 DIAGNOSIS — E66.01 MORBID OBESITY (HCC): ICD-10-CM

## 2019-05-22 LAB
ALBUMIN SERPL-MCNC: 4.4 G/DL (ref 3.5–5.2)
ALBUMIN/GLOB SERPL: 2 G/DL
ALP SERPL-CCNC: 77 U/L (ref 39–117)
ALT SERPL-CCNC: 24 U/L (ref 1–41)
AST SERPL-CCNC: 16 U/L (ref 1–40)
BILIRUB SERPL-MCNC: 0.5 MG/DL (ref 0.2–1.2)
BUN SERPL-MCNC: 13 MG/DL (ref 6–20)
BUN/CREAT SERPL: 14.9 (ref 7–25)
CALCIUM SERPL-MCNC: 9.4 MG/DL (ref 8.6–10.5)
CHLORIDE SERPL-SCNC: 103 MMOL/L (ref 98–107)
CO2 SERPL-SCNC: 25.9 MMOL/L (ref 22–29)
CREAT SERPL-MCNC: 0.87 MG/DL (ref 0.76–1.27)
GLOBULIN SER CALC-MCNC: 2.2 GM/DL
GLUCOSE SERPL-MCNC: 96 MG/DL (ref 65–99)
HBA1C MFR BLD: 5.2 % (ref 4.8–5.6)
POTASSIUM SERPL-SCNC: 4.2 MMOL/L (ref 3.5–5.2)
PROT SERPL-MCNC: 6.6 G/DL (ref 6–8.5)
SODIUM SERPL-SCNC: 141 MMOL/L (ref 136–145)

## 2019-06-03 ENCOUNTER — OFFICE VISIT (OUTPATIENT)
Dept: INTERNAL MEDICINE | Facility: CLINIC | Age: 44
End: 2019-06-03

## 2019-06-03 VITALS
WEIGHT: 246 LBS | DIASTOLIC BLOOD PRESSURE: 70 MMHG | OXYGEN SATURATION: 97 % | TEMPERATURE: 99.8 F | SYSTOLIC BLOOD PRESSURE: 128 MMHG | HEART RATE: 92 BPM | HEIGHT: 67 IN | BODY MASS INDEX: 38.61 KG/M2

## 2019-06-03 DIAGNOSIS — E66.01 MORBID OBESITY (HCC): ICD-10-CM

## 2019-06-03 DIAGNOSIS — Z86.69 HISTORY OF SLEEP APNEA: ICD-10-CM

## 2019-06-03 DIAGNOSIS — I10 ESSENTIAL HYPERTENSION: Primary | ICD-10-CM

## 2019-06-03 DIAGNOSIS — N52.1 ERECTILE DYSFUNCTION DUE TO DISEASES CLASSIFIED ELSEWHERE: ICD-10-CM

## 2019-06-03 DIAGNOSIS — Z91.09 ENVIRONMENTAL ALLERGIES: ICD-10-CM

## 2019-06-03 PROCEDURE — 99214 OFFICE O/P EST MOD 30 MIN: CPT | Performed by: INTERNAL MEDICINE

## 2019-06-03 RX ORDER — CETIRIZINE HYDROCHLORIDE 10 MG/1
10 TABLET ORAL AS NEEDED
COMMUNITY

## 2019-06-03 NOTE — PROGRESS NOTES
"Hypertension      HPI  Chris Thomas Jr. is a 43 y.o. male RTC in f/u: has been doing well. Had mediation for divorce today.   1. SARAH - dx'd in past, resolved on f/u sleep study after T&A and soft palattectomy in 2005.  No issues.  \"No, no.. According to my girlfirend I am not even snoring\".   2. HTN - dx'd in ~2003.  On one drug. No home checks.   3. Pre-DM/ Obesity - was  Victoza and Metformin for 'weight loss'. Diet is 'fairly good'.  Not much red meat or fatty foods. Has not lost any weight.  \"Lack of exercise\".  No exercise at this time.  Will do some swimming once weekly.  Feels like has lack of motivation. Time is biggest issue.   4. ED - improved after divorce and feels largely psychogenic.  Is doing  Better, but not ideal. Has some manitenance issues. Has sildenafil at home for PRN use.   5. Environmental Allergies - long hx, testing in youth. No prior immunotherapy. Has been using Zyrtec BID and \"it has been fine\".       Review of Systems   Constitution: Negative for malaise/fatigue and weight loss.   Cardiovascular: Negative for chest pain, dyspnea on exertion, irregular heartbeat and palpitations.   Respiratory: Negative for shortness of breath and sleep disturbances due to breathing.    Neurological: Negative for dizziness, headaches and light-headedness.       The following portions of the patient's history were reviewed and updated as appropriate: allergies, current medications, past medical history, past social history and problem list.      Current Outpatient Medications:   •  cetirizine (zyrTEC) 10 MG tablet, Take 10 mg by mouth Daily., Disp: , Rfl:   •  cyclobenzaprine (FLEXERIL) 10 MG tablet, Take 1 tablet(s) twice a day by oral route before meals for 90 days., Disp: 90 tablet, Rfl: 5  •  Hypertonic Nasal Wash (SINUS RINSE BOTTLE KIT) pack, 1 bottle into the nostril(s) as directed by provider 3 (Three) Times a Day., Disp: 1 each, Rfl: 0  •  Insulin Pen Needle 32G X 4 MM misc, Use as directed for " "victoza, Disp: 30 each, Rfl: 11  •  lisinopril (PRINIVIL,ZESTRIL) 20 MG tablet, Take 1 tablet(s) every day by mouth in the morning for 90 days., Disp: 90 tablet, Rfl: 3  •  meloxicam (MOBIC) 15 MG tablet, Take 1 tablet by mouth daily, Disp: 90 tablet, Rfl: 12  •  sildenafil (REVATIO) 20 MG tablet, Take 1-5 tablets by mouth As Needed., Disp: 90 tablet, Rfl: 2  •  vitamin D (ERGOCALCIFEROL) 83088 units capsule capsule, Take 1 capsule by mouth 1 (One) Time Per Week., Disp: 90 capsule, Rfl: 0    Vitals:    06/03/19 1541   BP: 128/70   Pulse: 92   Temp: 99.8 °F (37.7 °C)   SpO2: 97%   Weight: 112 kg (246 lb)   Height: 170.2 cm (67\")         Physical Exam   Constitutional: He is oriented to person, place, and time. He appears well-developed and well-nourished. No distress.   HENT:   Head: Normocephalic and atraumatic.   Mouth/Throat: Oropharynx is clear and moist. No oropharyngeal exudate.   Class III Mallampati     Eyes: Pupils are equal, round, and reactive to light.   Neck: Normal range of motion. Neck supple. Carotid bruit is not present.   Cardiovascular: Normal rate, regular rhythm and normal heart sounds.   Pulses:       Carotid pulses are 2+ on the right side, and 2+ on the left side.       Radial pulses are 2+ on the right side, and 2+ on the left side.   Pulmonary/Chest: Effort normal and breath sounds normal. No respiratory distress. He has no wheezes. He has no rales.   Musculoskeletal: He exhibits no edema.   Neurological: He is alert and oriented to person, place, and time. No cranial nerve deficit.   Psychiatric: He has a normal mood and affect. His behavior is normal.   Vitals reviewed.      Assessment/ Plan  Diagnoses and all orders for this visit:    Essential hypertension    Morbid obesity (CMS/HCC)    Erectile dysfunction due to diseases classified elsewhere    Environmental allergies    History of sleep apnea    Other orders  -     cetirizine (zyrTEC) 10 MG tablet; Take 10 mg by mouth " Daily.        Return in about 6 months (around 12/3/2019) for Annual physical.      Discussion:  Chris Thomas Jr. is a 43 y.o. male RTC in f/u:   1. SARAH - dx'd in past, resolved on f/u sleep study after T&A and soft palattectomy in 2005.  MAREK.  2. HTN - dx'd in ~2003.  Controlled on one drug. BMP OK.  3. Pre-DM/ Obesity - off past Victoza and Metformin for 'weight loss'. Diet isgood, but still lack of exercise. I urged pt to add more exercise to work on weight loss.  Will trend weight. FBS and A1C OK on labs.   4. ED - improved after divorce and feels largely psychogenic but still has some manitenance issues. Sildenafil PRN use. Weight loss and CV exercise advised.    5. Environmental Allergies - long hx, testing in youth. No prior immunotherapy. Controlled on Zyrtec BID for season.       RTC 6 months CPE, F labs prior

## 2020-01-10 DIAGNOSIS — I10 ESSENTIAL HYPERTENSION: ICD-10-CM

## 2020-01-10 DIAGNOSIS — E66.01 MORBID OBESITY (HCC): ICD-10-CM

## 2020-01-10 DIAGNOSIS — Z00.00 HEALTHCARE MAINTENANCE: Primary | ICD-10-CM

## 2020-01-10 DIAGNOSIS — E55.9 VITAMIN D DEFICIENCY: ICD-10-CM

## 2020-01-15 LAB
25(OH)D3+25(OH)D2 SERPL-MCNC: 29 NG/ML (ref 30–100)
ALBUMIN SERPL-MCNC: 4.2 G/DL (ref 3.5–5.2)
ALBUMIN/GLOB SERPL: 1.8 G/DL
ALP SERPL-CCNC: 72 U/L (ref 39–117)
ALT SERPL-CCNC: 28 U/L (ref 1–41)
APPEARANCE UR: CLEAR
AST SERPL-CCNC: 17 U/L (ref 1–40)
BACTERIA #/AREA URNS HPF: NORMAL /HPF
BASOPHILS # BLD AUTO: 0.01 10*3/MM3 (ref 0–0.2)
BASOPHILS NFR BLD AUTO: 0.1 % (ref 0–1.5)
BILIRUB SERPL-MCNC: 0.3 MG/DL (ref 0.2–1.2)
BILIRUB UR QL STRIP: NEGATIVE
BUN SERPL-MCNC: 15 MG/DL (ref 6–20)
BUN/CREAT SERPL: 14.7 (ref 7–25)
CALCIUM SERPL-MCNC: 9 MG/DL (ref 8.6–10.5)
CHLORIDE SERPL-SCNC: 103 MMOL/L (ref 98–107)
CHOLEST SERPL-MCNC: 146 MG/DL (ref 0–200)
CO2 SERPL-SCNC: 23.7 MMOL/L (ref 22–29)
COLOR UR: YELLOW
CREAT SERPL-MCNC: 1.02 MG/DL (ref 0.76–1.27)
EOSINOPHIL # BLD AUTO: 0.07 10*3/MM3 (ref 0–0.4)
EOSINOPHIL NFR BLD AUTO: 1 % (ref 0.3–6.2)
EPI CELLS #/AREA URNS HPF: NORMAL /HPF (ref 0–10)
ERYTHROCYTE [DISTWIDTH] IN BLOOD BY AUTOMATED COUNT: 13.6 % (ref 12.3–15.4)
GLOBULIN SER CALC-MCNC: 2.4 GM/DL
GLUCOSE SERPL-MCNC: 88 MG/DL (ref 65–99)
GLUCOSE UR QL: NEGATIVE
HBA1C MFR BLD: 5.5 % (ref 4.8–5.6)
HCT VFR BLD AUTO: 42.4 % (ref 37.5–51)
HDLC SERPL-MCNC: 36 MG/DL (ref 40–60)
HGB BLD-MCNC: 14.5 G/DL (ref 13–17.7)
HGB UR QL STRIP: NEGATIVE
IMM GRANULOCYTES # BLD AUTO: 0.01 10*3/MM3 (ref 0–0.05)
IMM GRANULOCYTES NFR BLD AUTO: 0.1 % (ref 0–0.5)
KETONES UR QL STRIP: NEGATIVE
LDLC SERPL CALC-MCNC: 90 MG/DL (ref 0–100)
LEUKOCYTE ESTERASE UR QL STRIP: NEGATIVE
LYMPHOCYTES # BLD AUTO: 2.11 10*3/MM3 (ref 0.7–3.1)
LYMPHOCYTES NFR BLD AUTO: 29.3 % (ref 19.6–45.3)
MCH RBC QN AUTO: 29.3 PG (ref 26.6–33)
MCHC RBC AUTO-ENTMCNC: 34.2 G/DL (ref 31.5–35.7)
MCV RBC AUTO: 85.7 FL (ref 79–97)
MICRO URNS: NORMAL
MICRO URNS: NORMAL
MONOCYTES # BLD AUTO: 0.46 10*3/MM3 (ref 0.1–0.9)
MONOCYTES NFR BLD AUTO: 6.4 % (ref 5–12)
NEUTROPHILS # BLD AUTO: 4.54 10*3/MM3 (ref 1.7–7)
NEUTROPHILS NFR BLD AUTO: 63.1 % (ref 42.7–76)
NITRITE UR QL STRIP: NEGATIVE
NRBC BLD AUTO-RTO: 0 /100 WBC (ref 0–0.2)
PH UR STRIP: 7 [PH] (ref 5–7.5)
PLATELET # BLD AUTO: 203 10*3/MM3 (ref 140–450)
POTASSIUM SERPL-SCNC: 4 MMOL/L (ref 3.5–5.2)
PROT SERPL-MCNC: 6.6 G/DL (ref 6–8.5)
PROT UR QL STRIP: NEGATIVE
RBC # BLD AUTO: 4.95 10*6/MM3 (ref 4.14–5.8)
RBC #/AREA URNS HPF: NORMAL /HPF (ref 0–2)
SODIUM SERPL-SCNC: 140 MMOL/L (ref 136–145)
SP GR UR: 1.01 (ref 1–1.03)
T4 FREE SERPL-MCNC: 0.87 NG/DL (ref 0.93–1.7)
TRIGL SERPL-MCNC: 99 MG/DL (ref 0–150)
TSH SERPL DL<=0.005 MIU/L-ACNC: 4.96 UIU/ML (ref 0.27–4.2)
URINALYSIS REFLEX: NORMAL
UROBILINOGEN UR STRIP-MCNC: 0.2 MG/DL (ref 0.2–1)
VLDLC SERPL CALC-MCNC: 19.8 MG/DL
WBC # BLD AUTO: 7.2 10*3/MM3 (ref 3.4–10.8)
WBC #/AREA URNS HPF: NORMAL /HPF (ref 0–5)

## 2020-01-16 ENCOUNTER — OFFICE VISIT (OUTPATIENT)
Dept: INTERNAL MEDICINE | Facility: CLINIC | Age: 45
End: 2020-01-16

## 2020-01-16 VITALS
HEIGHT: 67 IN | TEMPERATURE: 99.2 F | OXYGEN SATURATION: 98 % | HEART RATE: 75 BPM | DIASTOLIC BLOOD PRESSURE: 88 MMHG | SYSTOLIC BLOOD PRESSURE: 128 MMHG | BODY MASS INDEX: 39.24 KG/M2 | WEIGHT: 250 LBS | RESPIRATION RATE: 12 BRPM

## 2020-01-16 DIAGNOSIS — E66.9 OBESITY (BMI 30-39.9): ICD-10-CM

## 2020-01-16 DIAGNOSIS — N52.1 ERECTILE DYSFUNCTION DUE TO DISEASES CLASSIFIED ELSEWHERE: ICD-10-CM

## 2020-01-16 DIAGNOSIS — I10 ESSENTIAL HYPERTENSION: ICD-10-CM

## 2020-01-16 DIAGNOSIS — Z00.00 HEALTHCARE MAINTENANCE: Primary | ICD-10-CM

## 2020-01-16 DIAGNOSIS — M17.12 PRIMARY OSTEOARTHRITIS OF LEFT KNEE: ICD-10-CM

## 2020-01-16 DIAGNOSIS — Z91.09 ENVIRONMENTAL ALLERGIES: ICD-10-CM

## 2020-01-16 DIAGNOSIS — E55.9 VITAMIN D DEFICIENCY: ICD-10-CM

## 2020-01-16 PROBLEM — E78.5 DYSLIPIDEMIA: Status: ACTIVE | Noted: 2020-01-16

## 2020-01-16 PROCEDURE — 99396 PREV VISIT EST AGE 40-64: CPT | Performed by: INTERNAL MEDICINE

## 2020-01-16 NOTE — PROGRESS NOTES
"Annual Exam (review of medical issues )      HPI  Chris Thomas Jr. is a 44 y.o. male RTC in yearly CPE, review of medical issues:  Has been doing well.  Had some viral URI a few months ago, saw [facility], and dx'd with \"upper respiratory... Z-pack and some cough medicine and it knocked it right out\". Has new son last month and so sleep has been lacking a bit.   1. SARAH - dx'd in past, resolved on f/u sleep study after T&A and soft palattectomy in 2005. Has some rare snoring.  No other issues have recurred.   2. HTN - dx'd in ~2003. No home checks recently.  In past has been \"right where it needs to be\".   3. Pre-DM/ Obesity - off past Victoza and Metformin for 'weight loss'.  Weight has been stable overall, but \"has not been taking any extreme measures to loose weight'. Is vegetarian \"2/3rds of the time\" as girlfriend is vegetarian.  Feels like diet overall much better. No exercise right now.   4. ED - improved after divorce and feels largely psychogenic but still has some manitenance issues. No issues.     5. Environmental Allergies - long hx, testing in youth. No issues recently.  Is on Zyrtec daily.  Will miss med some time when able and restarts when gets stuffy again.  6. Vitamin D deficiency - on high dose weekly for years.     Review of Systems   Constitution: Negative for chills, fever, malaise/fatigue, weight gain and weight loss.   HENT: Negative for congestion, hearing loss, hoarse voice, odynophagia and sore throat.    Eyes: Negative for discharge, double vision, pain and redness.        Last eye exam 1/2019; wears contacts     Cardiovascular: Negative for chest pain, dyspnea on exertion, irregular heartbeat, leg swelling, near-syncope, palpitations and syncope.   Respiratory: Negative for cough, shortness of breath, sleep disturbances due to breathing and snoring.    Endocrine: Negative for polydipsia, polyphagia and polyuria.   Hematologic/Lymphatic: Negative for bleeding problem. Does not bruise/bleed " easily.   Musculoskeletal: Negative for joint pain, joint swelling, muscle cramps, muscle weakness and myalgias.   Gastrointestinal: Negative for constipation, diarrhea, dysphagia, heartburn, nausea and vomiting.   Genitourinary: Negative for dysuria, frequency, hematuria and hesitancy.   Neurological: Negative for dizziness, headaches and light-headedness.   Psychiatric/Behavioral: Negative for depression. The patient does not have insomnia and is not nervous/anxious.        Problem List:    Patient Active Problem List   Diagnosis   • Essential hypertension   • Chronic back pain   • Obesity (BMI 30-39.9)   • Erectile dysfunction   • Vitamin D deficiency   • Environmental allergies   • Primary osteoarthritis of left knee       Medical History:    Past Medical History:   Diagnosis Date   • Environmental allergies     tested in youth, no prior immunotherapy   • Erectile dysfunction    • Hypertension    • SARAH (obstructive sleep apnea)     resolved after uvulectomy and T & A in 2005   • Primary osteoarthritis of left knee    • Vitamin D deficiency         Social History:    Social History     Socioeconomic History   • Marital status:      Spouse name: Not on file   • Number of children: Not on file   • Years of education: Not on file   • Highest education level: Not on file   Occupational History   • Occupation: Pharmacy Tech     Employer: [employer]   Tobacco Use   • Smoking status: Never Smoker   • Smokeless tobacco: Never Used   Substance and Sexual Activity   • Alcohol use: Yes     Comment: 1-2 drinks/ week - month   • Drug use: No   • Sexual activity: Yes     Partners: Female     Comment: new partner, female; intermittent condoms; no STD's that aware of   Lifestyle   • Physical activity:     Days per week: 0 days     Minutes per session: 0 min   • Stress: Not on file   Social History Narrative    Diet - overall healthy, vegetables > fruit    Exercise - gym 3x/ week cardio, minimal weight/  resistance; none in 2020    Caffeine - off coffee; one diet soda daily       Family History:   Family History   Problem Relation Age of Onset   • Colon cancer Father         dx'd at 62   • Hypertension Father    • Alcohol abuse Maternal Uncle    • Stroke Maternal Grandfather    • Hypertension Mother    • Hypertension Sister    • Diabetes Maternal Grandmother    • Stroke Paternal Grandfather    • No Known Problems Son    • Lung disease Neg Hx        Surgical History:   Past Surgical History:   Procedure Laterality Date   • TONSILLECTOMY AND ADENOIDECTOMY  2005    Dr. Martinez, done for sleep apnea   • UVULECTOMY  2005    for SARAH         Current Outpatient Medications:   •  cetirizine (zyrTEC) 10 MG tablet, Take 10 mg by mouth Daily., Disp: , Rfl:   •  lisinopril (PRINIVIL,ZESTRIL) 20 MG tablet, Take 1 tablet(s) every day by mouth in the morning for 90 days., Disp: 90 tablet, Rfl: 3  •  vitamin D (ERGOCALCIFEROL) 75032 units capsule capsule, Take 1 capsule by mouth 1 (One) Time Per Week., Disp: 90 capsule, Rfl: 0  •  meloxicam (MOBIC) 15 MG tablet, Take 1 tablet by mouth daily, Disp: 90 tablet, Rfl: 12  •  sildenafil (REVATIO) 20 MG tablet, Take 1-5 tablets by mouth As Needed., Disp: 90 tablet, Rfl: 2    Vitals:    01/16/20 1517   BP: 128/88   Pulse: 75   Resp: 12   Temp: 99.2 °F (37.3 °C)   SpO2: 98%     Body mass index is 39.16 kg/m².    Physical Exam   Constitutional: He is oriented to person, place, and time. He appears well-developed and well-nourished. He is cooperative. No distress.   Obese habitus     HENT:   Head: Normocephalic and atraumatic.   Right Ear: Hearing, tympanic membrane, external ear and ear canal normal.   Left Ear: Hearing, tympanic membrane, external ear and ear canal normal.   Nose: Nose normal. No mucosal edema or rhinorrhea.   Mouth/Throat: Uvula is midline, oropharynx is clear and moist and mucous membranes are normal. No oral lesions. No uvula swelling. No oropharyngeal exudate, posterior  oropharyngeal edema or posterior oropharyngeal erythema.   Eyes: Pupils are equal, round, and reactive to light. Conjunctivae, EOM and lids are normal. Right eye exhibits no discharge. Left eye exhibits no discharge. No scleral icterus.   Neck: Normal range of motion and full passive range of motion without pain. Neck supple. Carotid bruit is not present. No thyroid mass and no thyromegaly present.   Cardiovascular: Normal rate, regular rhythm, S1 normal, S2 normal and normal heart sounds. Exam reveals no gallop and no friction rub.   No murmur heard.  Pulses:       Radial pulses are 2+ on the right side, and 2+ on the left side.        Dorsalis pedis pulses are 2+ on the right side, and 2+ on the left side.        Posterior tibial pulses are 2+ on the right side, and 2+ on the left side.   Pulmonary/Chest: Effort normal and breath sounds normal. No respiratory distress. He has no wheezes. He has no rhonchi. He has no rales.   Abdominal: Soft. Bowel sounds are normal. He exhibits no distension and no mass. There is no hepatosplenomegaly. There is no tenderness. There is no rebound and no guarding.   Genitourinary: Rectum normal and prostate normal. Prostate is not enlarged and not tender.   Musculoskeletal: Normal range of motion. He exhibits no edema.     Vascular Status -  His right foot exhibits normal foot vasculature  and no edema. His left foot exhibits normal foot vasculature  and no edema.  Skin Integrity  -  His right foot skin is intact.His left foot skin is intact..  Lymphadenopathy:     He has no cervical adenopathy.     He has no axillary adenopathy.        Right: No inguinal adenopathy present.        Left: No inguinal adenopathy present.   Neurological: He is alert and oriented to person, place, and time. He has normal strength and normal reflexes. He displays no tremor. No cranial nerve deficit or sensory deficit. He exhibits normal muscle tone. Gait normal.   Skin: Skin is warm, dry and intact. No  "rash noted.   Psychiatric: He has a normal mood and affect. His speech is normal and behavior is normal. Thought content normal. Cognition and memory are normal.   Vitals reviewed.      Assessment/ Plan  Diagnoses and all orders for this visit:    Healthcare maintenance    Essential hypertension    Obesity (BMI 30-39.9)    Vitamin D deficiency    Environmental allergies    Erectile dysfunction due to diseases classified elsewhere    Primary osteoarthritis of left knee        Return in about 1 year (around 1/16/2021) for Annual physical.      Discussion:  Chris Thomas Jr. is a 44 y.o. male RTC in yearly CPE, review of medical issues:    1. SARAH - dx'd in past, resolved on f/u sleep study after T&A and soft palattectomy in 2005.  MAREK.  2. HTN - dx'd in ~2003.  Controlled on one drug. BMP OK.  3. Hx of Pre-DM/ Obesity - off past Victoza and Metformin for 'weight loss'. Diet improved and mostly vegetarian. Needs to add exercise.  FBS and A1C OK on labs.   4. ED - improved after divorce and feels largely psychogenic. Sildenafil PRN use. Weight loss and CV exercise advised.    5. Environmental Allergies - long hx, testing in youth. No prior immunotherapy. Controlled on Zyrtec daily.   6. OA in L knee - MAREK. Rare NSAID use.   7. L shoulder injury with rotator cuff \"abrasion\" - in Spring of 2018. PRN Meloxicam with minimal pain issues. Non-surgical case per pt.   8. Hx of Hypogonadism - off topical testosterone. Repeat labs in 2018 showed normal testosterone Pt has robust body hair/ normal muscle mass on exam.  No tx advised.   9. Vitamin D deficiency - near replete on high dose weekly. C/W same.   10. Elevated TSH - noted on labs with low T4. Will recheck in 2-3 weeks with anti-TPO Ab. Discuss tx if needed after labs.   11. HM - labs d/w pt; Flu/ Hep A/ Tdap -UTD; Hep B serologies (-) in past, address with employee health; C-scope age 47; ELIDIA OK; add more exercise; weight loss advised.     RTC 1 year CPE, F labs prior  NF " labs in 3 weeks (TSH, fT4, anti-TPO Ab)

## 2020-01-24 DIAGNOSIS — R79.89 ELEVATED TSH: Primary | ICD-10-CM

## 2020-01-31 PROBLEM — R79.89 ELEVATED TSH: Status: ACTIVE | Noted: 2020-01-31

## 2020-01-31 LAB
T4 FREE SERPL-MCNC: 0.99 NG/DL (ref 0.93–1.7)
THYROPEROXIDASE AB SERPL-ACNC: 6 IU/ML (ref 0–34)
TSH SERPL DL<=0.005 MIU/L-ACNC: 4.98 UIU/ML (ref 0.27–4.2)

## 2020-07-08 DIAGNOSIS — R79.89 ELEVATED TSH: Primary | ICD-10-CM

## 2020-07-14 ENCOUNTER — TELEPHONE (OUTPATIENT)
Dept: INTERNAL MEDICINE | Facility: CLINIC | Age: 45
End: 2020-07-14

## 2020-07-14 NOTE — TELEPHONE ENCOUNTER
PATIENT STATES HE HAS ANOTHER APPOINTMENT THAT INTERFERES WITH TODAYS APPOINTMENT FOR LAB WORK       HE IS REQUESTING TO CANCEL FOR TODAYS LABS AND TO CALL BACK WITH ANOTHER APPOINTMENT TIME   ANY DAY BUT Wednesday AND THE FIRST MORNING APPOINTMENT AVAILABLE     GOOD CONTACT NUMBER   [phone] (H)

## 2021-01-01 ENCOUNTER — IMMUNIZATION (OUTPATIENT)
Dept: VACCINE CLINIC | Facility: HOSPITAL | Age: 46
End: 2021-01-01

## 2021-01-01 PROCEDURE — 91300 HC SARSCOV02 VAC 30MCG/0.3ML IM: CPT | Performed by: INTERNAL MEDICINE

## 2021-01-01 PROCEDURE — 0001A: CPT | Performed by: INTERNAL MEDICINE

## 2021-01-05 DIAGNOSIS — Z11.59 NEED FOR HEPATITIS C SCREENING TEST: ICD-10-CM

## 2021-01-05 DIAGNOSIS — E55.9 VITAMIN D DEFICIENCY: ICD-10-CM

## 2021-01-05 DIAGNOSIS — E66.9 OBESITY (BMI 30-39.9): ICD-10-CM

## 2021-01-05 DIAGNOSIS — E78.5 DYSLIPIDEMIA: ICD-10-CM

## 2021-01-05 DIAGNOSIS — R79.89 ELEVATED TSH: ICD-10-CM

## 2021-01-05 DIAGNOSIS — Z00.00 HEALTHCARE MAINTENANCE: Primary | ICD-10-CM

## 2021-01-05 DIAGNOSIS — I10 ESSENTIAL HYPERTENSION: ICD-10-CM

## 2021-01-22 ENCOUNTER — IMMUNIZATION (OUTPATIENT)
Dept: VACCINE CLINIC | Facility: HOSPITAL | Age: 46
End: 2021-01-22

## 2021-01-22 PROCEDURE — 0002A: CPT | Performed by: INTERNAL MEDICINE

## 2021-01-22 PROCEDURE — 91300 HC SARSCOV02 VAC 30MCG/0.3ML IM: CPT | Performed by: INTERNAL MEDICINE

## 2022-08-29 ENCOUNTER — TELEPHONE (OUTPATIENT)
Dept: FAMILY MEDICINE CLINIC | Facility: CLINIC | Age: 47
End: 2022-08-29

## 2022-08-29 NOTE — TELEPHONE ENCOUNTER
Caller: Chris Thomas Jr.    Relationship to patient: Self    Best call back number: [phone]    Chief complaint: PATIENT HAS EARACHE AND WANTS TO BE SEEN SOONER BUT HUB UNABLE O WARM TRANSFER    Type of visit: SAME DAY/OFFICE VISIT    Requested date: 8/29/22 OR 8/31/22 AROUND 1 PM    If rescheduling, when is the original appointment:9/2/22    Additional notes:PATIENT CALLED EXPERIENCING EARACHE OF LEFT EAR AND SCHEDULED AN  APPOINTMENT ON  9/2/22 BUT EXPRESSED HE WANTED TO BE SEEN SOONER.HUB WAS UNABLE TO WARM TRANSFER SO PATIENT REQUESTING TO KNOW IF HE CAN  BE SEEN TODAY 8/29/22 OR 8/31/22 AROUND 1 PM.REQUESTING A CALLBACK FOR UPDATES.

## 2022-08-31 ENCOUNTER — OFFICE VISIT (OUTPATIENT)
Dept: FAMILY MEDICINE CLINIC | Facility: CLINIC | Age: 47
End: 2022-08-31

## 2022-08-31 VITALS
SYSTOLIC BLOOD PRESSURE: 137 MMHG | OXYGEN SATURATION: 98 % | HEART RATE: 85 BPM | HEIGHT: 67 IN | DIASTOLIC BLOOD PRESSURE: 88 MMHG | WEIGHT: 254.4 LBS | BODY MASS INDEX: 39.93 KG/M2 | TEMPERATURE: 98.7 F

## 2022-08-31 DIAGNOSIS — Z13.29 SCREENING FOR THYROID DISORDER: ICD-10-CM

## 2022-08-31 DIAGNOSIS — G89.29 CHRONIC LOW BACK PAIN WITHOUT SCIATICA, UNSPECIFIED BACK PAIN LATERALITY: ICD-10-CM

## 2022-08-31 DIAGNOSIS — E66.9 OBESITY (BMI 30-39.9): ICD-10-CM

## 2022-08-31 DIAGNOSIS — N52.1 ERECTILE DYSFUNCTION DUE TO DISEASES CLASSIFIED ELSEWHERE: ICD-10-CM

## 2022-08-31 DIAGNOSIS — I10 ESSENTIAL HYPERTENSION: ICD-10-CM

## 2022-08-31 DIAGNOSIS — Z76.89 ENCOUNTER TO ESTABLISH CARE: ICD-10-CM

## 2022-08-31 DIAGNOSIS — Z13.220 SCREENING FOR LIPID DISORDERS: ICD-10-CM

## 2022-08-31 DIAGNOSIS — M17.12 PRIMARY OSTEOARTHRITIS OF LEFT KNEE: ICD-10-CM

## 2022-08-31 DIAGNOSIS — E78.5 DYSLIPIDEMIA: ICD-10-CM

## 2022-08-31 DIAGNOSIS — Z00.00 ANNUAL PHYSICAL EXAM: ICD-10-CM

## 2022-08-31 DIAGNOSIS — Z13.1 SCREENING FOR DIABETES MELLITUS: ICD-10-CM

## 2022-08-31 DIAGNOSIS — R79.89 ELEVATED TSH: ICD-10-CM

## 2022-08-31 DIAGNOSIS — Z91.09 ENVIRONMENTAL ALLERGIES: ICD-10-CM

## 2022-08-31 DIAGNOSIS — Z13.29 SCREENING FOR ENDOCRINE DISORDER: ICD-10-CM

## 2022-08-31 DIAGNOSIS — H65.192 OTHER NON-RECURRENT ACUTE NONSUPPURATIVE OTITIS MEDIA OF LEFT EAR: Primary | ICD-10-CM

## 2022-08-31 DIAGNOSIS — M54.50 CHRONIC LOW BACK PAIN WITHOUT SCIATICA, UNSPECIFIED BACK PAIN LATERALITY: ICD-10-CM

## 2022-08-31 DIAGNOSIS — Z11.59 ENCOUNTER FOR HEPATITIS C SCREENING TEST FOR LOW RISK PATIENT: ICD-10-CM

## 2022-08-31 PROCEDURE — 99215 OFFICE O/P EST HI 40 MIN: CPT | Performed by: REGISTERED NURSE

## 2022-08-31 RX ORDER — DOXYCYCLINE HYCLATE 100 MG/1
100 CAPSULE ORAL 2 TIMES DAILY
Qty: 20 CAPSULE | Refills: 0 | Status: SHIPPED | OUTPATIENT
Start: 2022-08-31 | End: 2023-01-31

## 2023-01-30 ENCOUNTER — TELEPHONE (OUTPATIENT)
Dept: FAMILY MEDICINE CLINIC | Facility: CLINIC | Age: 48
End: 2023-01-30
Payer: COMMERCIAL

## 2023-01-30 NOTE — TELEPHONE ENCOUNTER
Caller: Chris Thomas Jr.    Relationship to patient: Self    Best call back number: [phone]    Chief complaint: PASSED OUT COUGHING    Patient directed to call 911 or go to their nearest emergency room.     Patient verbalized understanding: [] Yes  [x] No  If no, why?    Additional notes:ATTEMPTED A WARM TRANSFER, UNSUCCESSFUL.    PATIENT STATED CANNOT GO TO ER.      Caller: Chris Thomas Jr.    Relationship to patient: Self    Best call back number: [phone]    Chief complaint: SEVERE COUGH, PASSED OUT COUGHING REALLY HARD, DRAINAGE, CONGESTION    Type of visit: SAME DAY    Requested date: 01/30    If rescheduling, when is the original appointment: N/A    Additional notes:THERE WERE NOT ANY SAME DAY APPTS.  FIRST AVAILABLE WAS FEB 28.    ADVISED ER, PATIENT REFUSED.

## 2023-01-30 NOTE — TELEPHONE ENCOUNTER
Called and spoke to patient. Informed that we do not have any same day appointments with [facility]. I scheduled patient for next day office visit with Luis Gonzalez on 1/31/23 at 9:45am.

## 2023-01-31 ENCOUNTER — OFFICE VISIT (OUTPATIENT)
Dept: FAMILY MEDICINE CLINIC | Age: 48
End: 2023-01-31
Payer: COMMERCIAL

## 2023-01-31 ENCOUNTER — HOSPITAL ENCOUNTER (OUTPATIENT)
Dept: GENERAL RADIOLOGY | Facility: HOSPITAL | Age: 48
Discharge: HOME | End: 2023-01-31
Payer: COMMERCIAL

## 2023-01-31 ENCOUNTER — HOSPITAL ENCOUNTER (OUTPATIENT)
Dept: CARDIOLOGY | Facility: HOSPITAL | Age: 48
Discharge: HOME | End: 2023-01-31
Payer: COMMERCIAL

## 2023-01-31 VITALS
HEIGHT: 67 IN | BODY MASS INDEX: 40.56 KG/M2 | SYSTOLIC BLOOD PRESSURE: 140 MMHG | OXYGEN SATURATION: 98 % | RESPIRATION RATE: 18 BRPM | WEIGHT: 258.4 LBS | DIASTOLIC BLOOD PRESSURE: 82 MMHG | TEMPERATURE: 97.8 F | HEART RATE: 83 BPM

## 2023-01-31 DIAGNOSIS — M25.551 RIGHT HIP PAIN: ICD-10-CM

## 2023-01-31 DIAGNOSIS — R55 SYNCOPE, UNSPECIFIED SYNCOPE TYPE: ICD-10-CM

## 2023-01-31 DIAGNOSIS — R05.1 ACUTE COUGH: Primary | ICD-10-CM

## 2023-01-31 DIAGNOSIS — M16.11 OSTEOARTHRITIS OF RIGHT HIP, UNSPECIFIED OSTEOARTHRITIS TYPE: ICD-10-CM

## 2023-01-31 DIAGNOSIS — J20.9 ACUTE BRONCHITIS, UNSPECIFIED ORGANISM: ICD-10-CM

## 2023-01-31 DIAGNOSIS — R05.1 ACUTE COUGH: ICD-10-CM

## 2023-01-31 DIAGNOSIS — J98.01 BRONCHOSPASM: ICD-10-CM

## 2023-01-31 LAB
EXPIRATION DATE: NORMAL
FLUAV AG UPPER RESP QL IA.RAPID: NOT DETECTED
FLUBV AG UPPER RESP QL IA.RAPID: NOT DETECTED
INTERNAL CONTROL: NORMAL
Lab: NORMAL
SARS-COV-2 AG UPPER RESP QL IA.RAPID: NOT DETECTED

## 2023-01-31 PROCEDURE — 71046 X-RAY EXAM CHEST 2 VIEWS: CPT

## 2023-01-31 PROCEDURE — 93005 ELECTROCARDIOGRAM TRACING: CPT | Performed by: NURSE PRACTITIONER

## 2023-01-31 PROCEDURE — 99214 OFFICE O/P EST MOD 30 MIN: CPT | Performed by: NURSE PRACTITIONER

## 2023-01-31 PROCEDURE — 73502 X-RAY EXAM HIP UNI 2-3 VIEWS: CPT

## 2023-01-31 PROCEDURE — 93010 ELECTROCARDIOGRAM REPORT: CPT | Performed by: INTERNAL MEDICINE

## 2023-01-31 PROCEDURE — 87428 SARSCOV & INF VIR A&B AG IA: CPT | Performed by: NURSE PRACTITIONER

## 2023-01-31 RX ORDER — ALBUTEROL SULFATE 90 UG/1
2 AEROSOL, METERED RESPIRATORY (INHALATION) EVERY 4 HOURS PRN
Qty: 8 G | Refills: 0 | Status: SHIPPED | OUTPATIENT
Start: 2023-01-31

## 2023-01-31 RX ORDER — BENZONATATE 200 MG/1
200 CAPSULE ORAL 3 TIMES DAILY PRN
Qty: 30 CAPSULE | Refills: 0 | Status: SHIPPED | OUTPATIENT
Start: 2023-01-31 | End: 2023-02-10

## 2023-01-31 RX ORDER — DOXYCYCLINE HYCLATE 100 MG/1
100 CAPSULE ORAL 2 TIMES DAILY
Qty: 20 CAPSULE | Refills: 0 | Status: SHIPPED | OUTPATIENT
Start: 2023-01-31 | End: 2023-02-10

## 2023-01-31 RX ORDER — METHYLPREDNISOLONE 4 MG/1
TABLET ORAL
Qty: 21 TABLET | Refills: 0 | Status: SHIPPED | OUTPATIENT
Start: 2023-01-31

## 2023-01-31 NOTE — PROGRESS NOTES
"Chief Complaint  Loss of Consciousness (Passed out from coughing on 1/29/23)    History of Present Illness  47-year-old male patient presents today complaining of coughing for a little over a week.  Patient states he lost consciousness on 1/29/2023.  Patient states prior to losing consciousness he had a bad coughing spell where it caused him not to be able to inhale prior to losing consciousness.  Patient denies fever, chills or body aches, headache, lightheadedness, dizziness, numbness or tingling, chest pain, palpitations, leg swelling, chest tightness, wheezing, shortness of breath, difficulty breathing, abdominal pain, NVD, back pain, rash, or any other symptom.  Patient denies history of asthma.  Patient denies being around anybody sick that he knows of.  Patient states he works for [employer].  Discussed risk of syncopal episode with no history of this and advised patient on hospital ER work-up, patient deferred at this time.  Patient states he is hoping to get a steroid and antibiotic today.         Review of Systems   Constitutional: Negative.    HENT: Negative.    Eyes: Negative.    Respiratory: Positive for cough. Negative for chest tightness, shortness of breath and wheezing.    Cardiovascular: Negative.    Gastrointestinal: Negative.    Endocrine: Negative.    Genitourinary: Negative.    Musculoskeletal: Negative.    Skin: Negative.    Allergic/Immunologic: Negative.    Neurological: Positive for syncope (on 01/29). Negative for dizziness, tremors, seizures, facial asymmetry, speech difficulty, weakness, light-headedness, numbness and headaches.   Hematological: Negative.    Psychiatric/Behavioral: Negative.         Objective     Vital Signs:   /82 (BP Location: Right arm, Patient Position: Sitting, Cuff Size: Large Adult)   Pulse 83   Temp 97.8 °F (36.6 °C) (Temporal)   Resp 18   Ht 170.2 cm (67\")   Wt 117 kg (258 lb 6.4 oz)   SpO2 98%   BMI 40.47 kg/m²   Current Outpatient Medications on File " Prior to Visit   Medication Sig Dispense Refill   • cetirizine (zyrTEC) 10 MG tablet Take 10 mg by mouth As Needed.     • sildenafil (REVATIO) 20 MG tablet Take 1-5 tablets by mouth As Needed. 90 tablet 2   • [DISCONTINUED] doxycycline (VIBRAMYCIN) 100 MG capsule Take 1 capsule by mouth 2 (Two) Times a Day. 20 capsule 0   • [DISCONTINUED] meloxicam (MOBIC) 15 MG tablet Take 1 tablet by mouth daily 90 tablet 12   • [DISCONTINUED] vitamin D (ERGOCALCIFEROL) 25013 units capsule capsule Take 1 capsule by mouth 1 (One) Time Per Week. 90 capsule 0     No current facility-administered medications on file prior to visit.        Past Medical History:   Diagnosis Date   • Environmental allergies     tested in youth, no prior immunotherapy   • Erectile dysfunction    • Hypertension    • SARAH (obstructive sleep apnea)     resolved after uvulectomy and T & A in 2005   • Primary osteoarthritis of left knee    • Vitamin D deficiency       Past Surgical History:   Procedure Laterality Date   • TONSILLECTOMY AND ADENOIDECTOMY  2005    Dr. Martinez, done for sleep apnea   • UVULECTOMY  2005    for SARAH      Family History   Problem Relation Age of Onset   • Colon cancer Father         dx'd at 62   • Hypertension Father    • Alcohol abuse Maternal Uncle    • Stroke Maternal Grandfather    • Hypertension Mother    • Hypertension Sister    • Diabetes Maternal Grandmother    • Stroke Paternal Grandfather    • No Known Problems Son    • Lung disease Neg Hx       Social History     Socioeconomic History   • Marital status:    Tobacco Use   • Smoking status: Never   • Smokeless tobacco: Never   Vaping Use   • Vaping Use: Never used   Substance and Sexual Activity   • Alcohol use: Yes     Comment: rare occassion   • Drug use: No   • Sexual activity: Yes     Partners: Female     Comment: [facility] Outpatient Visit on 01/31/2023   Component Date Value Ref Range Status   • QT Interval 01/31/2023 388  ms Preliminary    Office Visit on 01/31/2023   Component Date Value Ref Range Status   • SARS Antigen 01/31/2023 Not Detected  Not Detected, Presumptive Negative Final   • Influenza A Antigen JULIANA 01/31/2023 Not Detected  Not Detected Final   • Influenza B Antigen JULIANA 01/31/2023 Not Detected  Not Detected Final   • Internal Control 01/31/2023 Passed  Passed Final   • Lot Number 01/31/2023 [id number]   Final   • Expiration Date 01/31/2023 1-2-2024   Final         Physical Exam  Vitals and nursing note reviewed.   Constitutional:       General: He is not in acute distress.     Appearance: Normal appearance. He is not ill-appearing, toxic-appearing or diaphoretic.   HENT:      Head: Normocephalic and atraumatic.      Jaw: There is normal jaw occlusion.      Right Ear: Hearing, tympanic membrane, ear canal and external ear normal.      Left Ear: Hearing, tympanic membrane, ear canal and external ear normal.      Nose: Nose normal.      Mouth/Throat:      Lips: Pink.      Pharynx: Oropharynx is clear. Uvula midline.   Eyes:      General: Lids are normal. Vision grossly intact. Gaze aligned appropriately.      Extraocular Movements: Extraocular movements intact.      Conjunctiva/sclera: Conjunctivae normal.      Pupils: Pupils are equal, round, and reactive to light.   Cardiovascular:      Rate and Rhythm: Normal rate and regular rhythm.      Pulses: Normal pulses.           Carotid pulses are 2+ on the right side and 2+ on the left side.       Radial pulses are 2+ on the right side and 2+ on the left side.        Dorsalis pedis pulses are 2+ on the right side and 2+ on the left side.        Posterior tibial pulses are 2+ on the right side and 2+ on the left side.      Heart sounds: Normal heart sounds, S1 normal and S2 normal. No murmur heard.  Pulmonary:      Effort: Pulmonary effort is normal. No tachypnea or respiratory distress.      Breath sounds: Normal breath sounds and air entry. No decreased air movement. No decreased breath  sounds or wheezing.   Abdominal:      General: Abdomen is flat. Bowel sounds are normal. There is no distension or abdominal bruit.      Palpations: Abdomen is soft.      Tenderness: There is no abdominal tenderness.   Musculoskeletal:         General: Normal range of motion.      Cervical back: Full passive range of motion without pain, normal range of motion and neck supple.      Right lower leg: No edema.      Left lower leg: No edema.   Skin:     General: Skin is warm and dry.      Capillary Refill: Capillary refill takes less than 2 seconds.      Coloration: Skin is not pale.      Findings: No bruising, erythema or rash.   Neurological:      General: No focal deficit present.      Mental Status: He is alert and oriented to person, place, and time. Mental status is at baseline.      GCS: GCS eye subscore is 4. GCS verbal subscore is 5. GCS motor subscore is 6.      Cranial Nerves: Cranial nerves 2-12 are intact. No cranial nerve deficit.      Sensory: Sensation is intact. No sensory deficit.      Motor: Motor function is intact. No weakness.      Coordination: Coordination is intact. Coordination normal.      Gait: Gait is intact. Gait normal.      Deep Tendon Reflexes: Reflexes normal.   Psychiatric:         Attention and Perception: Attention and perception normal.         Mood and Affect: Mood and affect normal.         Speech: Speech normal.         Behavior: Behavior normal. Behavior is cooperative.         Thought Content: Thought content normal.         Cognition and Memory: Cognition and memory normal.         Judgment: Judgment normal.               Assessment and Plan {CC Problem List  Visit Diagnosis  ROS  Review (Popup)  [facility] Maintenance  Quality  BestPractice  Medications  SmartSets  SnapShot Encounters  Media :23}   Diagnoses and all orders for this visit:    1. Acute cough (Primary)  Comments:  Rapid flu & covid negative today. Prescribed Tessalon PRN, albuterol inhaler PRN, steroid  dose pack. Pending chest xray.   Orders:  -     POCT SARS-CoV-2 Antigen JULIANA + Flu  -     albuterol sulfate  (90 Base) MCG/ACT inhaler; Inhale 2 puffs Every 4 (Four) Hours As Needed for Wheezing or Shortness of Air.  Dispense: 8 g; Refill: 0  -     benzonatate (TESSALON) 200 MG capsule; Take 1 capsule by mouth 3 (Three) Times a Day As Needed for Cough for up to 10 days.  Dispense: 30 capsule; Refill: 0  -     methylPREDNISolone (MEDROL) 4 MG dose pack; Take as directed on package instructions.  Dispense: 21 tablet; Refill: 0  -     XR Chest PA & Lateral; Future    2. Right hip pain  Comments:  Pending right hip xray. Prescribed steroid dose pack.   Orders:  -     XR Hip With or Without Pelvis 2 - 3 View Right; Future  -     Ambulatory Referral to Orthopedic Surgery    3. Syncope, unspecified syncope type  Comments:  Instructed patient on ER workup at this time, patient defered at this time. Pending CBC, CMP, Chest Xray, and EKG.   Orders:  -     XR Chest PA & Lateral; Future  -     ECG 12 Lead; Future  -     CBC & Differential; Future  -     Comprehensive metabolic panel; Future    4. Acute bronchitis, unspecified organism  Comments:  Treating with Doxycycline, steroid dose pack. Pending chest xray.   Orders:  -     doxycycline (VIBRAMYCIN) 100 MG capsule; Take 1 capsule by mouth 2 (Two) Times a Day for 10 days.  Dispense: 20 capsule; Refill: 0    5. Bronchospasm  Comments:  Patient states on 01/29 when he had syncopal episode, he could not inhale prior to syncopal episode. Prescribed Albuterol inhaler PRN & steroid dosepack.    6. Osteoarthritis of right hip, unspecified osteoarthritis type  -     Ambulatory Referral to Orthopedic Surgery          Follow Up {Instructions Charge Capture  Follow-up Communications :23}     There are no Patient Instructions on file for this visit.    Return if symptoms worsen or fail to improve.    DESMOND Suarez, FNP-BC

## 2023-02-01 ENCOUNTER — CLINICAL SUPPORT (OUTPATIENT)
Dept: FAMILY MEDICINE CLINIC | Age: 48
End: 2023-02-01
Payer: COMMERCIAL

## 2023-02-01 DIAGNOSIS — R55 SYNCOPE, UNSPECIFIED SYNCOPE TYPE: ICD-10-CM

## 2023-02-01 PROCEDURE — 36415 COLL VENOUS BLD VENIPUNCTURE: CPT | Performed by: NURSE PRACTITIONER

## 2023-02-01 PROCEDURE — 80053 COMPREHEN METABOLIC PANEL: CPT | Performed by: NURSE PRACTITIONER

## 2023-02-01 NOTE — PROGRESS NOTES
Venipuncture Blood Specimen Collection  Venipuncture performed in RIGHT ARM by Farrah Yung MA with good hemostasis. PATIENT IS FASTING  Patient tolerated the procedure well without complications.   02/01/23   Farrah Yung MA

## 2023-02-02 LAB
ALBUMIN SERPL-MCNC: 4.7 G/DL (ref 3.5–5.2)
ALBUMIN/GLOB SERPL: 1.6 G/DL
ALP SERPL-CCNC: 79 U/L (ref 39–117)
ALT SERPL W P-5'-P-CCNC: 26 U/L (ref 1–41)
ANION GAP SERPL CALCULATED.3IONS-SCNC: 11.4 MMOL/L (ref 5–15)
AST SERPL-CCNC: 18 U/L (ref 1–40)
BILIRUB SERPL-MCNC: 0.3 MG/DL (ref 0–1.2)
BUN SERPL-MCNC: 12 MG/DL (ref 6–20)
BUN/CREAT SERPL: 14 (ref 7–25)
CALCIUM SPEC-SCNC: 9.6 MG/DL (ref 8.6–10.5)
CHLORIDE SERPL-SCNC: 103 MMOL/L (ref 98–107)
CO2 SERPL-SCNC: 24.6 MMOL/L (ref 22–29)
CREAT SERPL-MCNC: 0.86 MG/DL (ref 0.76–1.27)
EGFRCR SERPLBLD CKD-EPI 2021: 108.8 ML/MIN/1.73
GLOBULIN UR ELPH-MCNC: 2.9 GM/DL
GLUCOSE SERPL-MCNC: 79 MG/DL (ref 65–99)
POTASSIUM SERPL-SCNC: 3.9 MMOL/L (ref 3.5–5.2)
PROT SERPL-MCNC: 7.6 G/DL (ref 6–8.5)
QT INTERVAL: 388 MS
SODIUM SERPL-SCNC: 139 MMOL/L (ref 136–145)

## 2023-02-03 ENCOUNTER — TELEPHONE (OUTPATIENT)
Dept: FAMILY MEDICINE CLINIC | Age: 48
End: 2023-02-03
Payer: COMMERCIAL

## 2023-02-03 NOTE — TELEPHONE ENCOUNTER
Phoned pt to let him know that we didn't get enough blood, for one of the test ordered. The pt is coming in Monday morning for his redrawl.

## 2023-06-05 ENCOUNTER — OFFICE VISIT (OUTPATIENT)
Dept: ORTHOPEDIC SURGERY | Facility: CLINIC | Age: 48
End: 2023-06-05
Payer: COMMERCIAL

## 2023-06-05 VITALS
RESPIRATION RATE: 18 BRPM | HEIGHT: 67 IN | HEART RATE: 67 BPM | BODY MASS INDEX: 41.28 KG/M2 | OXYGEN SATURATION: 97 % | WEIGHT: 263 LBS

## 2023-06-05 DIAGNOSIS — M16.11 PRIMARY OSTEOARTHRITIS OF RIGHT HIP: ICD-10-CM

## 2023-06-05 DIAGNOSIS — E66.01 OBESITY, MORBID, BMI 40.0-49.9: Primary | ICD-10-CM

## 2023-06-05 RX ADMIN — TRIAMCINOLONE ACETONIDE 80 MG: 40 INJECTION, SUSPENSION INTRA-ARTICULAR; INTRAMUSCULAR at 08:49

## 2023-06-05 NOTE — PROGRESS NOTES
"Primary Care Sports Medicine Office Visit Note     Patient ID: Chris Thomas Jr. is a 47 y.o. male.    Chief Complaint:  Chief Complaint   Patient presents with    Right Hip - Initial Evaluation     HPI:    Mr. Chris Thomas Jr. is a 47 y.o. male.  The patient presents to the clinic today for right hip pain.    The patient has been experiencing hip pain for approximately 1 year. He began experiencing increased hip pain around 11/2022. Within the past 1 to 2 months, the patient is experiencing severe \"stabbing\" hip pain. He began physical therapy motions at home that have been counterproductive. The patient has taken naproxen and ibuprofen. He has taken Mobic that was left from his previous shoulder injury; however, this did not alleviate his symptoms. He denies taking allergy medication. The patient denies any respiratory or incontinence issues currently. He admits to instability in his hip.  He denies any falls. The patient admits to constant lower back pain.     Past Medical History:   Diagnosis Date    Environmental allergies     tested in youth, no prior immunotherapy    Erectile dysfunction     Hypertension     SARAH (obstructive sleep apnea)     resolved after uvulectomy and T & A in 2005    Primary osteoarthritis of left knee     Vitamin D deficiency        Past Surgical History:   Procedure Laterality Date    TONSILLECTOMY AND ADENOIDECTOMY  2005    Dr. Martinez, done for sleep apnea    UVULECTOMY  2005    for SARAH       Family History   Problem Relation Age of Onset    Colon cancer Father         dx'd at 62    Hypertension Father     Alcohol abuse Maternal Uncle     Stroke Maternal Grandfather     Hypertension Mother     Hypertension Sister     Diabetes Maternal Grandmother     Stroke Paternal Grandfather     No Known Problems Son     Lung disease Neg Hx      Social History     Occupational History    Occupation: Pharmacy Tech     Employer: [employer]   Tobacco Use    Smoking status: Never    " "Smokeless tobacco: Never   Vaping Use    Vaping Use: Never used   Substance and Sexual Activity    Alcohol use: Yes     Comment: rare occassion    Drug use: No    Sexual activity: Yes     Partners: Female     Comment: Galina Castañeda      Review of Systems   Constitutional:  Negative for activity change and fever.   Respiratory:  Negative for cough and shortness of breath.    Cardiovascular:  Negative for chest pain.   Gastrointestinal:  Negative for constipation, diarrhea, nausea and vomiting.   Musculoskeletal:  Positive for arthralgias.   Skin:  Negative for color change and rash.   Neurological:  Negative for weakness.   Hematological:  Does not bruise/bleed easily.   Objective:    Pulse 67   Resp 18   Ht 170.2 cm (67\")   Wt 119 kg (263 lb)   SpO2 97%   BMI 41.19 kg/m²     Physical Examination:  Physical Exam  Vitals and nursing note reviewed.   Constitutional:       General: He is not in acute distress.     Appearance: He is well-developed. He is not diaphoretic.   HENT:      Head: Normocephalic and atraumatic.   Eyes:      Conjunctiva/sclera: Conjunctivae normal.   Pulmonary:      Effort: Pulmonary effort is normal. No respiratory distress.   Skin:     General: Skin is warm.      Capillary Refill: Capillary refill takes less than 2 seconds.   Neurological:      Mental Status: He is alert.     Right Hip Exam     Range of Motion   Right hip flexion: moderatly limited to about 90 degrees, obligate external rotation with extremes of flexion.   Right hip external rotation: 45 degrees.   Right hip internal rotation: near 0 due to pain.     Tests   JOAO: positive  Fadir:  Positive FADIR test    Comments:  Negative Stinchfield, positive scour      Imaging and other tests:  Three view x-ray of the right hip today shows moderate to severe osteoarthritic disease in the superolateral aspect of the femoroacetabular joint with osteophytic activity. Otherwise no acute bony abnormality.    Assessment and Plan:    1. " Obesity, morbid, BMI 40.0-49.9    2. Primary osteoarthritis of the right hip.    After discussion of risks and benefits, the patient elected to proceed with corticosteroid injection to the of the right hip.  The patient tolerated this procedure well without any complaints or problems. I recommended continuation of conservative management as previous, RTC in 3-6 months or sooner if symptoms recur.    Transcribed from ambient dictation for Rishi Olmedo II,  by Katerin Purvis.  06/05/23   10:54 EDT    Patient or patient representative verbalized consent to the visit recording.  I have personally performed the services described in this document as transcribed by the above individual, and it is both accurate and complete.    Disclaimer: Please note that areas of this note were completed with computer voice recognition software.  Quite often unanticipated grammatical, syntax, homophones, and other interpretive errors are inadvertently transcribed by the computer software. Please excuse any errors that have escaped final proofreading.

## 2023-06-06 RX ORDER — TRIAMCINOLONE ACETONIDE 40 MG/ML
80 INJECTION, SUSPENSION INTRA-ARTICULAR; INTRAMUSCULAR
Status: COMPLETED | OUTPATIENT
Start: 2023-06-05 | End: 2023-06-05

## 2023-06-06 NOTE — PROGRESS NOTES
Procedure   Large Joint Arthrocentesis: R hip joint  Date/Time: 6/5/2023 8:49 AM  Consent given by: patient  Site marked: site marked  Timeout: Immediately prior to procedure a time out was called to verify the correct patient, procedure, equipment, support staff and site/side marked as required   Supporting Documentation  Indications: pain   Procedure Details  Location: hip - R hip joint  Preparation: Patient was prepped and draped in the usual sterile fashion  Needle size: 22 G (spinal)  Approach: anterior (Ultrasound guidance was used to visualize vascular structures, care was taken to avoid there structures. Please see US machine for saved images.)  Medications administered: 80 mg triamcinolone acetonide 40 MG/ML (2cc of 2% lidocaine without epinepherine and 2cc of 40mg kenalog)  Patient tolerance: patient tolerated the procedure well with no immediate complications

## 2023-06-09 ENCOUNTER — PATIENT ROUNDING (BHMG ONLY) (OUTPATIENT)
Dept: ORTHOPEDIC SURGERY | Facility: CLINIC | Age: 48
End: 2023-06-09
Payer: COMMERCIAL

## 2023-06-09 NOTE — PROGRESS NOTES
I had a very good visit. Despite getting less than desirable news of the state of my ailment, the staff was very helpful and I appreciated them walking me through everything step by step to make me feel comfortable. Dr Olmedo was incredible. I needed an injection in my hip and he did a fantastic job. From someone who hates needles, that is a pretty big statement. The only thing I could recommend to change is the check in process. One person at a desk that is so close to the door was not a great situation. With privacy being important to most people it may not be the best setup. I was only third in line to check in when I got there and I waited in the hallway (not even into the office yet) for a good 10-15 minutes. I wasn’t mad about it, but I see room for improvement there for the overall patient experience.

## 2023-10-18 ENCOUNTER — OFFICE VISIT (OUTPATIENT)
Dept: ORTHOPEDIC SURGERY | Facility: CLINIC | Age: 48
End: 2023-10-18
Payer: COMMERCIAL

## 2023-10-18 VITALS — WEIGHT: 250 LBS | BODY MASS INDEX: 39.24 KG/M2 | HEIGHT: 67 IN

## 2023-10-18 DIAGNOSIS — M16.11 PRIMARY OSTEOARTHRITIS OF RIGHT HIP: Primary | ICD-10-CM

## 2023-10-18 RX ORDER — TRIAMCINOLONE ACETONIDE 40 MG/ML
80 INJECTION, SUSPENSION INTRA-ARTICULAR; INTRAMUSCULAR
Status: COMPLETED | OUTPATIENT
Start: 2023-10-18 | End: 2023-10-18

## 2023-10-18 RX ADMIN — TRIAMCINOLONE ACETONIDE 80 MG: 40 INJECTION, SUSPENSION INTRA-ARTICULAR; INTRAMUSCULAR at 10:04

## 2023-10-18 NOTE — PROGRESS NOTES
Primary Care Sports Medicine Office Visit Note     Patient ID: Chris Thomas Jr. is a 48 y.o. male.    Chief Complaint:  Chief Complaint   Patient presents with    Right Hip - Follow-up, Pain     HPI:    Mr. Chris Thomas Jr. is a 48 y.o. male. The patient presents to the clinic today for follow up evaluation of right hip.    The patient received a steroid injection in 06/2023. He had mixed results. He had some immediate relief, although he never fully felt like he was 100 percent great. He had some beats and balance, and if he overdid it, it would hurt for a day or so. He would get a little bit of rest and try to take it easy, and it would be fine. He had some fairly decent stretches where he did not really bother him. About 3 weeks ago, it started to be consistent every day, and then it progressively over the next week or so, it got pretty worse. He has been taking ibuprofen.      Past Medical History:   Diagnosis Date    Environmental allergies     tested in youth, no prior immunotherapy    Erectile dysfunction     Fracture, clavicle 1984    Broken collar bone as a child    Hip arthrosis 10/2022    This is the reason for my visit    Hypertension     Low back strain 1995    Work injury - [employer]    SARAH (obstructive sleep apnea)     resolved after uvulectomy and T & A in 2005    Osgood-Schlatter's disease 1994    Never officially diagnosed. But was very symptomatic    Primary osteoarthritis of left knee     Vitamin D deficiency        Past Surgical History:   Procedure Laterality Date    TONSILLECTOMY AND ADENOIDECTOMY  2005    Dr. Martinez, done for sleep apnea    UVULECTOMY  2005    for SARAH       Family History   Problem Relation Age of Onset    Colon cancer Father         dx'd at 62    Hypertension Father     Cancer Father         Colon cancer    Alcohol abuse Maternal Uncle     Stroke Maternal Grandfather     Hypertension Mother     Hypertension Sister     Diabetes Maternal Grandmother     Stroke Paternal  "Grandfather     No Known Problems Son     Lung disease Neg Hx      Social History     Occupational History    Occupation: Pharmacy Tech     Employer: [employer]   Tobacco Use    Smoking status: Never    Smokeless tobacco: Never    Tobacco comments:     Never smoked or used tobacco of any kind   Vaping Use    Vaping Use: Never used   Substance and Sexual Activity    Alcohol use: Not Currently     Comment: rare occassion    Drug use: No    Sexual activity: Yes     Partners: Female     Birth control/protection: Coitus interruptus, None     Comment: Galina Castañeda      Review of Systems   Constitutional:  Negative for activity change, fatigue and fever.   Musculoskeletal:  Positive for arthralgias.   Skin:  Negative for color change and rash.   Neurological:  Negative for numbness.     Objective:    Ht 170.2 cm (67\")   Wt 113 kg (250 lb)   BMI 39.16 kg/m²     Physical Examination:  Physical Exam  Vitals and nursing note reviewed.   Constitutional:       General: He is not in acute distress.     Appearance: He is well-developed. He is not diaphoretic.   HENT:      Head: Normocephalic and atraumatic.   Eyes:      Conjunctiva/sclera: Conjunctivae normal.   Pulmonary:      Effort: Pulmonary effort is normal. No respiratory distress.   Skin:     General: Skin is warm.      Capillary Refill: Capillary refill takes less than 2 seconds.   Neurological:      Mental Status: He is alert.       Right Hip Exam     Range of Motion   The patient has normal right hip ROM.          Imaging and other tests:  None today.    Assessment and Plan:  1. Primary osteoarthritis of right hip    After discussion of risks and benefits, the patient elected to proceed with corticosteroid injection to the right intra-articular hip with ultrasound guidance. The patient tolerated this procedure well without any complaints or problems. I recommended continuation of conservative management as previous, return to clinic in 3-6 months or " sooner if symptoms recur.    Transcribed from ambient dictation for Rishi Olmedo II, DO by Muriel Donaldson.  10/18/23   09:56 EDT    Patient or patient representative verbalized consent to the visit recording.  I have personally performed the services described in this document as transcribed by the above individual, and it is both accurate and complete.    Disclaimer: Please note that areas of this note were completed with computer voice recognition software.  Quite often unanticipated grammatical, syntax, homophones, and other interpretive errors are inadvertently transcribed by the computer software. Please excuse any errors that have escaped final proofreading.

## 2023-10-18 NOTE — PROGRESS NOTES
Procedure   Large Joint Arthrocentesis: R hip joint  Date/Time: 10/18/2023 10:04 AM  Consent given by: patient  Site marked: site marked  Timeout: Immediately prior to procedure a time out was called to verify the correct patient, procedure, equipment, support staff and site/side marked as required   Supporting Documentation  Indications: pain   Procedure Details  Location: hip - R hip joint  Preparation: Patient was prepped and draped in the usual sterile fashion  Needle size: 22 G (spinal)  Approach: anterior (Ultrasound guidance was used to visualize vascular structures, care was taken to avoid there structures. Please see US machine for saved images.)  Medications administered: 80 mg triamcinolone acetonide 40 MG/ML (2cc of 2% lidocaine without epinepherine and 2cc of 40mg kenalog)  Patient tolerance: patient tolerated the procedure well with no immediate complications

## 2023-12-14 ENCOUNTER — OFFICE VISIT (OUTPATIENT)
Dept: FAMILY MEDICINE CLINIC | Facility: CLINIC | Age: 48
End: 2023-12-14
Payer: COMMERCIAL

## 2023-12-14 VITALS
BODY MASS INDEX: 40.02 KG/M2 | HEART RATE: 76 BPM | SYSTOLIC BLOOD PRESSURE: 143 MMHG | HEIGHT: 67 IN | DIASTOLIC BLOOD PRESSURE: 90 MMHG | WEIGHT: 255 LBS | OXYGEN SATURATION: 98 % | TEMPERATURE: 98.1 F

## 2023-12-14 DIAGNOSIS — M87.051 AVASCULAR NECROSIS OF BONE OF HIP, RIGHT: Primary | ICD-10-CM

## 2023-12-14 DIAGNOSIS — E66.9 OBESITY (BMI 30-39.9): ICD-10-CM

## 2023-12-14 DIAGNOSIS — E66.9 CLASS 2 OBESITY WITHOUT SERIOUS COMORBIDITY WITH BODY MASS INDEX (BMI) OF 39.0 TO 39.9 IN ADULT, UNSPECIFIED OBESITY TYPE: ICD-10-CM

## 2023-12-14 DIAGNOSIS — N52.9 ERECTILE DYSFUNCTION, UNSPECIFIED ERECTILE DYSFUNCTION TYPE: ICD-10-CM

## 2023-12-14 DIAGNOSIS — M25.551 RIGHT HIP PAIN: ICD-10-CM

## 2023-12-14 DIAGNOSIS — R79.89 ELEVATED TSH: ICD-10-CM

## 2023-12-14 PROBLEM — E66.812 CLASS 2 OBESITY WITHOUT SERIOUS COMORBIDITY WITH BODY MASS INDEX (BMI) OF 39.0 TO 39.9 IN ADULT: Status: ACTIVE | Noted: 2023-12-14

## 2023-12-14 RX ORDER — SILDENAFIL 50 MG/1
50 TABLET, FILM COATED ORAL DAILY PRN
Qty: 10 TABLET | Refills: 0 | Status: SHIPPED | OUTPATIENT
Start: 2023-12-14

## 2023-12-14 RX ORDER — PREDNISONE 20 MG/1
TABLET ORAL
Qty: 9 TABLET | Refills: 0 | Status: SHIPPED | OUTPATIENT
Start: 2023-12-14 | End: 2023-12-23

## 2023-12-14 RX ORDER — IBUPROFEN 800 MG/1
800 TABLET ORAL EVERY 8 HOURS PRN
Qty: 90 TABLET | Refills: 1 | Status: SHIPPED | OUTPATIENT
Start: 2023-12-14

## 2023-12-15 DIAGNOSIS — R73.03 PREDIABETES: ICD-10-CM

## 2023-12-15 DIAGNOSIS — R73.09 ELEVATED HEMOGLOBIN A1C: ICD-10-CM

## 2023-12-15 DIAGNOSIS — E03.9 ACQUIRED HYPOTHYROIDISM: Primary | ICD-10-CM

## 2023-12-15 DIAGNOSIS — R79.89 LOW TESTOSTERONE: ICD-10-CM

## 2023-12-15 LAB
ALBUMIN SERPL-MCNC: 4.4 G/DL (ref 4.1–5.1)
ALBUMIN/GLOB SERPL: 1.8 {RATIO} (ref 1.2–2.2)
ALP SERPL-CCNC: 83 IU/L (ref 44–121)
ALT SERPL-CCNC: 24 IU/L (ref 0–44)
AST SERPL-CCNC: 17 IU/L (ref 0–40)
BILIRUB SERPL-MCNC: 0.2 MG/DL (ref 0–1.2)
BUN SERPL-MCNC: 16 MG/DL (ref 6–24)
BUN/CREAT SERPL: 13 (ref 9–20)
CALCIUM SERPL-MCNC: 9.3 MG/DL (ref 8.7–10.2)
CHLORIDE SERPL-SCNC: 102 MMOL/L (ref 96–106)
CO2 SERPL-SCNC: 26 MMOL/L (ref 20–29)
CREAT SERPL-MCNC: 1.26 MG/DL (ref 0.76–1.27)
EGFRCR SERPLBLD CKD-EPI 2021: 71 ML/MIN/1.73
ERYTHROCYTE [DISTWIDTH] IN BLOOD BY AUTOMATED COUNT: 13.1 % (ref 11.6–15.4)
GLOBULIN SER CALC-MCNC: 2.5 G/DL (ref 1.5–4.5)
GLUCOSE SERPL-MCNC: 105 MG/DL (ref 70–99)
HBA1C MFR BLD: 5.8 % (ref 4.8–5.6)
HCT VFR BLD AUTO: 45.2 % (ref 37.5–51)
HGB BLD-MCNC: 15.1 G/DL (ref 13–17.7)
MCH RBC QN AUTO: 29.4 PG (ref 26.6–33)
MCHC RBC AUTO-ENTMCNC: 33.4 G/DL (ref 31.5–35.7)
MCV RBC AUTO: 88 FL (ref 79–97)
PLATELET # BLD AUTO: 208 X10E3/UL (ref 150–450)
POTASSIUM SERPL-SCNC: 4.6 MMOL/L (ref 3.5–5.2)
PROT SERPL-MCNC: 6.9 G/DL (ref 6–8.5)
RBC # BLD AUTO: 5.13 X10E6/UL (ref 4.14–5.8)
SODIUM SERPL-SCNC: 142 MMOL/L (ref 134–144)
T3FREE SERPL-MCNC: 3.1 PG/ML (ref 2–4.4)
T4 SERPL-MCNC: 6.7 UG/DL (ref 4.5–12)
TSH SERPL DL<=0.005 MIU/L-ACNC: 4.53 UIU/ML (ref 0.45–4.5)
WBC # BLD AUTO: 9.6 X10E3/UL (ref 3.4–10.8)

## 2023-12-15 RX ORDER — LEVOTHYROXINE SODIUM 0.05 MG/1
50 TABLET ORAL DAILY
Qty: 30 TABLET | Refills: 1 | Status: SHIPPED | OUTPATIENT
Start: 2023-12-15

## 2023-12-15 NOTE — PROGRESS NOTES
We get a chance please let me know what patient states about the semaglutide.  Also I forgot to mention that thyroid medication needs to be taken an hour before food intake which can decrease its ability to work properly

## 2023-12-15 NOTE — PROGRESS NOTES
Patient's labs have returned.  There was a computer issue crossing over so (out has been reviewed by myself and scanned into the chart.    Testosterone low at 230.  Will need a second recheck in the next 6 to 8 weeks and if both values are low may consider testosterone therapy.  Will place a future order for patient to get drawn.  Thyroid panel was high at 4.530 -I would consider treating this with a low-dose thyroid medication called Synthroid.    Additionally patient's A1c is elevated at 5.8 indicating he is prediabetic.  I sent prescription semaglutide and yesterday, was he able to pick this up or was not approved by insurance?  He can come in January 2019 for lab check on thyroid panel and testosterone

## 2023-12-18 NOTE — PROGRESS NOTES
Regarding the prediabetes recommend low carbohydrates increase protein and exercising 30 minutes 5 days a week.

## 2023-12-19 DIAGNOSIS — E66.9 CLASS 2 OBESITY WITHOUT SERIOUS COMORBIDITY WITH BODY MASS INDEX (BMI) OF 39.0 TO 39.9 IN ADULT, UNSPECIFIED OBESITY TYPE: ICD-10-CM

## 2023-12-19 DIAGNOSIS — R73.03 PREDIABETES: Primary | ICD-10-CM

## 2023-12-19 DIAGNOSIS — R73.09 ELEVATED HEMOGLOBIN A1C: ICD-10-CM

## 2023-12-19 DIAGNOSIS — E66.9 OBESITY (BMI 30-39.9): ICD-10-CM

## 2023-12-19 RX ORDER — SEMAGLUTIDE 0.25 MG/.5ML
0.25 INJECTION, SOLUTION SUBCUTANEOUS WEEKLY
Qty: 2 ML | Refills: 0 | Status: SHIPPED | OUTPATIENT
Start: 2023-12-19

## 2023-12-20 LAB
TESTOST FREE SERPL-MCNC: 3.4 PG/ML (ref 6.8–21.5)
TESTOST SERPL-MCNC: 230 NG/DL (ref 264–916)

## 2024-01-09 ENCOUNTER — TELEPHONE (OUTPATIENT)
Dept: ORTHOPEDIC SURGERY | Facility: CLINIC | Age: 49
End: 2024-01-09

## 2024-01-09 DIAGNOSIS — E66.9 CLASS 2 OBESITY WITHOUT SERIOUS COMORBIDITY WITH BODY MASS INDEX (BMI) OF 39.0 TO 39.9 IN ADULT, UNSPECIFIED OBESITY TYPE: ICD-10-CM

## 2024-01-09 DIAGNOSIS — R73.03 PREDIABETES: ICD-10-CM

## 2024-01-09 DIAGNOSIS — M87.051 AVASCULAR NECROSIS OF BONE OF HIP, RIGHT: ICD-10-CM

## 2024-01-09 DIAGNOSIS — E66.9 OBESITY (BMI 30-39.9): ICD-10-CM

## 2024-01-09 DIAGNOSIS — R73.09 ELEVATED HEMOGLOBIN A1C: Primary | ICD-10-CM

## 2024-01-09 DIAGNOSIS — M25.551 RIGHT HIP PAIN: ICD-10-CM

## 2024-01-09 DIAGNOSIS — R73.09 ELEVATED HEMOGLOBIN A1C: ICD-10-CM

## 2024-01-09 RX ORDER — SEMAGLUTIDE 0.25 MG/.5ML
0.25 INJECTION, SOLUTION SUBCUTANEOUS WEEKLY
Qty: 2 ML | Refills: 0 | Status: CANCELLED | OUTPATIENT
Start: 2024-01-09

## 2024-01-09 NOTE — TELEPHONE ENCOUNTER
Please advise patient I have increased semaglutide to 0.5 mg weekly.  A new prescription has been sent to pharmacy on file.  [facility].

## 2024-01-09 NOTE — TELEPHONE ENCOUNTER
Provider: DR FREY    Caller: PATIENT     Phone Number: [phone]    Reason for Call: PATIENT HAS A NEW ANTHEM PLAN AS OF 1-1-24 AND HIS NEW CO PAY FOR SPECIALIST IS $60.00 BUT WHEN HE DID CHECK IN ON MY CHART IS SHOWS $80.00. HE WOULD JUST LIKE TO CONFIRM IT IS $60.00 AND HAVE THIS UPDATED PLEASE.

## 2024-01-09 NOTE — TELEPHONE ENCOUNTER
Called and patient stated that he had called insurance and it was in fact $60.00. patient will need to pay that for visits instead of $80.00

## 2024-01-11 DIAGNOSIS — M25.551 RIGHT HIP PAIN: ICD-10-CM

## 2024-01-11 DIAGNOSIS — R73.09 ELEVATED HEMOGLOBIN A1C: ICD-10-CM

## 2024-01-11 DIAGNOSIS — E66.9 CLASS 2 OBESITY WITHOUT SERIOUS COMORBIDITY WITH BODY MASS INDEX (BMI) OF 39.0 TO 39.9 IN ADULT, UNSPECIFIED OBESITY TYPE: ICD-10-CM

## 2024-01-11 DIAGNOSIS — M87.051 AVASCULAR NECROSIS OF BONE OF HIP, RIGHT: ICD-10-CM

## 2024-01-11 DIAGNOSIS — E66.9 OBESITY (BMI 30-39.9): ICD-10-CM

## 2024-01-11 DIAGNOSIS — R73.03 PREDIABETES: ICD-10-CM

## 2024-01-16 ENCOUNTER — OFFICE VISIT (OUTPATIENT)
Dept: ORTHOPEDIC SURGERY | Facility: CLINIC | Age: 49
End: 2024-01-16
Payer: COMMERCIAL

## 2024-01-16 VITALS — HEART RATE: 82 BPM | OXYGEN SATURATION: 98 % | BODY MASS INDEX: 39.11 KG/M2 | HEIGHT: 67 IN | WEIGHT: 249.2 LBS

## 2024-01-16 DIAGNOSIS — M16.11 PRIMARY OSTEOARTHRITIS OF RIGHT HIP: Primary | ICD-10-CM

## 2024-01-16 PROCEDURE — 99213 OFFICE O/P EST LOW 20 MIN: CPT | Performed by: FAMILY MEDICINE

## 2024-01-16 RX ORDER — METHYLPREDNISOLONE 4 MG/1
TABLET ORAL
Qty: 21 TABLET | Refills: 0 | Status: SHIPPED | OUTPATIENT
Start: 2024-01-16

## 2024-01-16 NOTE — PROGRESS NOTES
Primary Care Sports Medicine Office Visit Note     Patient ID: Chris Thomas Jr. is a 48 y.o. male.    Chief Complaint:  Chief Complaint   Patient presents with    Right Hip - Pain, Follow-up     HPI:    Mr. Chris Thomas Jr. is a 48 y.o. male. The patient presents to the clinic today for follow up evaluation of right hip pain.    The patient reports that the last couple of times he has had injections, they only lasted 1 to 2 weeks. It is not giving him any relief. In 12/2023 or 01/2024, he went to his primary care physician, and they gave him prednisone, Ultracet, and Motrin 800 mg. He usually takes Motrin once or twice daily, and it dulls the pain for the most part. He cannot sleep at night, because the second he moves or does anything in the bed, it grabs, and he gets a sharp, shooting pain. It wakes him up, and he cannot get comfortable again. He is here to discuss surgery. He does not want to have surgery; however, at the same time, he does not see any point in putting himself through this and then being sure of the time to do the surgery. He is snipping at his family because he is in pain all the time. When he came in here today, 01/16/2024, his pain was a 4/10.      Past Medical History:   Diagnosis Date    Environmental allergies     tested in youth, no prior immunotherapy    Erectile dysfunction     Fracture, clavicle 1984    Broken collar bone as a child    Hip arthrosis 10/2022    This is the reason for my visit    Hypertension     Low back pain 1/1/1998    Back injury.  Sporadic pain from excessive activity    Low back strain 1995    Work injury - [employer]    Obesity 1/1/2000    SARAH (obstructive sleep apnea)     resolved after uvulectomy and T & A in 2005    Osgood-Schlatter's disease 1994    Never officially diagnosed. But was very symptomatic    Primary osteoarthritis of left knee     Vitamin D deficiency        Past Surgical History:   Procedure Laterality Date    ADENOIDECTOMY  2004    TONSILLECTOMY AND  "ADENOIDECTOMY  2005    Dr. Martinez, done for sleep apnea    UVULECTOMY  2005    for SARAH       Family History   Problem Relation Age of Onset    Colon cancer Father         dx'd at 62    Hypertension Father     Cancer Father         Colon cancer    Hyperlipidemia Father     Alcohol abuse Maternal Uncle     Stroke Maternal Grandfather     Hypertension Mother     Hyperlipidemia Mother     Hypertension Sister     Diabetes Maternal Grandmother     Arthritis Maternal Grandmother     Stroke Paternal Grandfather     Heart disease Paternal Grandfather     No Known Problems Son     COPD Paternal Grandmother     Lung disease Neg Hx      Social History     Occupational History    Occupation: Pharmacy Tech     Employer: [employer]   Tobacco Use    Smoking status: Never    Smokeless tobacco: Never    Tobacco comments:     Never smoked or used tobacco of any kind   Vaping Use    Vaping Use: Never used   Substance and Sexual Activity    Alcohol use: Not Currently     Comment: rare occassion    Drug use: No    Sexual activity: Yes     Partners: Female     Birth control/protection: Coitus interruptus, None     Comment: Galina Castañeda      Review of Systems   Constitutional:  Negative for activity change, fatigue and fever.   Musculoskeletal:  Positive for arthralgias.   Skin:  Negative for color change and rash.   Neurological:  Negative for numbness.     Objective:    Pulse 82   Ht 170.2 cm (67\")   Wt 113 kg (249 lb 3.2 oz)   SpO2 98%   BMI 39.03 kg/m²     Physical Examination:  Physical Exam  Vitals and nursing note reviewed.   Constitutional:       General: He is not in acute distress.     Appearance: He is well-developed. He is not diaphoretic.   HENT:      Head: Normocephalic and atraumatic.   Eyes:      Conjunctiva/sclera: Conjunctivae normal.   Pulmonary:      Effort: Pulmonary effort is normal. No respiratory distress.   Skin:     General: Skin is warm.      Capillary Refill: Capillary refill takes less than " 2 seconds.   Neurological:      Mental Status: He is alert.       Right Hip Exam   Right hip exam is normal.           Imaging and other tests:  Repeat three view x-ray of the AP pelvis and right hip today yields considerable osteoarthritic narrowing with near bone on bone articulation of the right hip joint. There is superior and inferior osteophytic activity as well.    Assessment and Plan:    1. Primary osteoarthritis of right hip  - XR Hip With or Without Pelvis 2 - 3 View Right    I discussed treatment options with the patient to include at this point even out of pocket hyaluronic acid or PRP. Neither of these are financially suitable for the patient. He has attempted and failed conservative measures including over the counter and prescription anti-inflammatory, steroid, and pain medication at this point. He has failed physical therapy and strengthening, and multiple recent intra-articular corticosteroid injections which only last a week or so. For that reason, I will refer him to my surgical colleague Dr. Dawson for GAYLE evaluation and intervention as he sees fit.    Transcribed from ambient dictation for Rishi Olmedo II,  by Muriel Donaldson.  01/16/24   09:15 EST    Patient or patient representative verbalized consent to the visit recording.  I have personally performed the services described in this document as transcribed by the above individual, and it is both accurate and complete.    Disclaimer: Please note that areas of this note were completed with computer voice recognition software.  Quite often unanticipated grammatical, syntax, homophones, and other interpretive errors are inadvertently transcribed by the computer software. Please excuse any errors that have escaped final proofreading.

## 2024-01-19 DIAGNOSIS — M87.051 AVASCULAR NECROSIS OF BONE OF HIP, RIGHT: ICD-10-CM

## 2024-01-19 DIAGNOSIS — M25.551 RIGHT HIP PAIN: ICD-10-CM

## 2024-01-26 DIAGNOSIS — Z86.19 HISTORY OF MONONUCLEOSIS: Primary | ICD-10-CM

## 2024-01-29 DIAGNOSIS — M25.551 RIGHT HIP PAIN: ICD-10-CM

## 2024-01-29 DIAGNOSIS — M87.051 AVASCULAR NECROSIS OF BONE OF HIP, RIGHT: ICD-10-CM

## 2024-02-06 DIAGNOSIS — E03.9 ACQUIRED HYPOTHYROIDISM: ICD-10-CM

## 2024-02-06 DIAGNOSIS — N52.9 ERECTILE DYSFUNCTION, UNSPECIFIED ERECTILE DYSFUNCTION TYPE: ICD-10-CM

## 2024-02-06 RX ORDER — LEVOTHYROXINE SODIUM 0.05 MG/1
50 TABLET ORAL DAILY
Qty: 30 TABLET | Refills: 1 | Status: SHIPPED | OUTPATIENT
Start: 2024-02-06

## 2024-02-06 RX ORDER — SILDENAFIL 50 MG/1
50 TABLET, FILM COATED ORAL DAILY PRN
Qty: 10 TABLET | Refills: 0 | Status: SHIPPED | OUTPATIENT
Start: 2024-02-06

## 2024-02-16 DIAGNOSIS — M25.551 RIGHT HIP PAIN: ICD-10-CM

## 2024-02-16 DIAGNOSIS — M87.051 AVASCULAR NECROSIS OF BONE OF HIP, RIGHT: ICD-10-CM

## 2024-02-16 RX ORDER — IBUPROFEN 800 MG/1
800 TABLET ORAL EVERY 8 HOURS PRN
Qty: 180 TABLET | Refills: 1 | Status: SHIPPED | OUTPATIENT
Start: 2024-02-16

## 2024-03-04 ENCOUNTER — PRE-ADMISSION TESTING (OUTPATIENT)
Dept: PREADMISSION TESTING | Facility: HOSPITAL | Age: 49
End: 2024-03-04
Payer: COMMERCIAL

## 2024-03-04 ENCOUNTER — HOSPITAL ENCOUNTER (OUTPATIENT)
Dept: GENERAL RADIOLOGY | Facility: HOSPITAL | Age: 49
Discharge: HOME | End: 2024-03-04
Payer: COMMERCIAL

## 2024-03-04 ENCOUNTER — ANESTHESIA EVENT (OUTPATIENT)
Dept: PERIOP | Facility: HOSPITAL | Age: 49
End: 2024-03-04
Payer: COMMERCIAL

## 2024-03-04 VITALS
TEMPERATURE: 98.4 F | OXYGEN SATURATION: 99 % | RESPIRATION RATE: 16 BRPM | BODY MASS INDEX: 39.19 KG/M2 | HEIGHT: 67 IN | SYSTOLIC BLOOD PRESSURE: 162 MMHG | DIASTOLIC BLOOD PRESSURE: 96 MMHG | WEIGHT: 249.7 LBS | HEART RATE: 77 BPM

## 2024-03-04 LAB
ALBUMIN SERPL-MCNC: 4.1 G/DL (ref 3.5–5.2)
ALBUMIN/GLOB SERPL: 1.5 G/DL
ALP SERPL-CCNC: 90 U/L (ref 39–117)
ALT SERPL W P-5'-P-CCNC: 20 U/L (ref 1–41)
ANION GAP SERPL CALCULATED.3IONS-SCNC: 11.6 MMOL/L (ref 5–15)
AST SERPL-CCNC: 14 U/L (ref 1–40)
BACTERIA UR QL AUTO: NORMAL /HPF
BILIRUB SERPL-MCNC: 0.2 MG/DL (ref 0–1.2)
BILIRUB UR QL STRIP: NEGATIVE
BUN SERPL-MCNC: 12 MG/DL (ref 6–20)
BUN/CREAT SERPL: 12.4 (ref 7–25)
CALCIUM SPEC-SCNC: 8.9 MG/DL (ref 8.6–10.5)
CHLORIDE SERPL-SCNC: 105 MMOL/L (ref 98–107)
CLARITY UR: CLEAR
CO2 SERPL-SCNC: 25.4 MMOL/L (ref 22–29)
COLOR UR: YELLOW
CREAT SERPL-MCNC: 0.97 MG/DL (ref 0.76–1.27)
CRP SERPL-MCNC: 0.62 MG/DL (ref 0–0.5)
DEPRECATED RDW RBC AUTO: 41.2 FL (ref 37–54)
EGFRCR SERPLBLD CKD-EPI 2021: 97.5 ML/MIN/1.73
ERYTHROCYTE [DISTWIDTH] IN BLOOD BY AUTOMATED COUNT: 13.2 % (ref 12.3–15.4)
GLOBULIN UR ELPH-MCNC: 2.8 GM/DL
GLUCOSE SERPL-MCNC: 109 MG/DL (ref 65–99)
GLUCOSE UR STRIP-MCNC: NEGATIVE MG/DL
HBA1C MFR BLD: 5.5 % (ref 4.8–5.6)
HCT VFR BLD AUTO: 43.6 % (ref 37.5–51)
HGB BLD-MCNC: 14.8 G/DL (ref 13–17.7)
HGB UR QL STRIP.AUTO: NEGATIVE
HYALINE CASTS UR QL AUTO: NORMAL /LPF
INR PPP: 0.99 (ref 0.9–1.1)
KETONES UR QL STRIP: NEGATIVE
LEUKOCYTE ESTERASE UR QL STRIP.AUTO: NEGATIVE
MCH RBC QN AUTO: 29.5 PG (ref 26.6–33)
MCHC RBC AUTO-ENTMCNC: 33.9 G/DL (ref 31.5–35.7)
MCV RBC AUTO: 86.9 FL (ref 79–97)
NITRITE UR QL STRIP: NEGATIVE
PH UR STRIP.AUTO: 5.5 [PH] (ref 5–8)
PLATELET # BLD AUTO: 200 10*3/MM3 (ref 140–450)
PMV BLD AUTO: 10.5 FL (ref 6–12)
POTASSIUM SERPL-SCNC: 3.7 MMOL/L (ref 3.5–5.2)
PROT SERPL-MCNC: 6.9 G/DL (ref 6–8.5)
PROT UR QL STRIP: NEGATIVE
PROTHROMBIN TIME: 13.2 SECONDS (ref 11.7–14.2)
QT INTERVAL: 373 MS
QTC INTERVAL: 411 MS
RBC # BLD AUTO: 5.02 10*6/MM3 (ref 4.14–5.8)
RBC # UR STRIP: NORMAL /HPF
REF LAB TEST METHOD: NORMAL
SODIUM SERPL-SCNC: 142 MMOL/L (ref 136–145)
SP GR UR STRIP: 1.01 (ref 1–1.03)
SQUAMOUS #/AREA URNS HPF: NORMAL /HPF
TSH SERPL DL<=0.05 MIU/L-ACNC: 2.71 UIU/ML (ref 0.27–4.2)
UROBILINOGEN UR QL STRIP: NORMAL
WBC # UR STRIP: NORMAL /HPF
WBC NRBC COR # BLD AUTO: 7.9 10*3/MM3 (ref 3.4–10.8)

## 2024-03-04 PROCEDURE — 73502 X-RAY EXAM HIP UNI 2-3 VIEWS: CPT

## 2024-03-04 PROCEDURE — 93005 ELECTROCARDIOGRAM TRACING: CPT

## 2024-03-04 PROCEDURE — 83036 HEMOGLOBIN GLYCOSYLATED A1C: CPT

## 2024-03-04 PROCEDURE — 84402 ASSAY OF FREE TESTOSTERONE: CPT | Performed by: NURSE PRACTITIONER

## 2024-03-04 PROCEDURE — 80050 GENERAL HEALTH PANEL: CPT

## 2024-03-04 PROCEDURE — 71046 X-RAY EXAM CHEST 2 VIEWS: CPT

## 2024-03-04 PROCEDURE — 85610 PROTHROMBIN TIME: CPT

## 2024-03-04 PROCEDURE — 81001 URINALYSIS AUTO W/SCOPE: CPT

## 2024-03-04 PROCEDURE — 86140 C-REACTIVE PROTEIN: CPT

## 2024-03-04 PROCEDURE — 93010 ELECTROCARDIOGRAM REPORT: CPT | Performed by: INTERNAL MEDICINE

## 2024-03-04 PROCEDURE — 36415 COLL VENOUS BLD VENIPUNCTURE: CPT

## 2024-03-04 PROCEDURE — 84403 ASSAY OF TOTAL TESTOSTERONE: CPT | Performed by: NURSE PRACTITIONER

## 2024-03-04 NOTE — DISCHARGE INSTRUCTIONS
Take the following medications the morning of surgery: LEVOTHYROXINE      If you are on prescription narcotic pain medication to control your pain you may also take that medication the morning of surgery.    General Instructions:  Do not eat solid food after midnight the night before surgery.  You may drink clear liquids day of surgery but must stop at least one hour before your hospital arrival time.  It is beneficial for you to have a clear drink that contains carbohydrates the day of surgery.  We suggest a 12 to 20 ounce bottle of Gatorade or Powerade for non-diabetic patients or a 12 to 20 ounce bottle of G2 or Powerade Zero for diabetic patients.     Clear liquids are liquids you can see through.  Nothing red in color.     Plain water                               Sports drinks  Sodas                                   Gelatin (Jell-O)  Fruit juices without pulp such as white grape juice and apple juice  Popsicles that contain no fruit or yogurt  Tea or coffee (no cream or milk added)  Gatorade / Powerade  G2 / Powerade Zero    Bring any papers given to you in the doctor’s office.  Wear clean comfortable clothes.  Do not wear contact lenses, false eyelashes or make-up.  Bring a case for your glasses.   Bring crutches or walker if applicable.  Remove all piercings.  Leave jewelry and any other valuables at home.  The Pre-Admission Testing nurse will instruct you to bring medications if unable to obtain an accurate list in Pre-Admission Testing.        If you were given a blood bank ID arm band remember to bring it with you the day of surgery.    Preventing a Surgical Site Infection:  For 2 to 3 days before surgery, avoid shaving with a razor because the razor can irritate skin and make it easier to develop an infection.    Any areas of open skin can increase the risk of a post-operative wound infection by allowing bacteria to enter and travel throughout the body.  Notify your surgeon if you have any skin wounds /  rashes even if it is not near the expected surgical site.  The area will need assessed to determine if surgery should be delayed until it is healed.  The night prior to surgery shower using a fresh bar of anti-bacterial soap (such as Dial) and clean washcloth.  Sleep in a clean bed with clean clothing.  Do not allow pets to sleep with you.  Shower on the morning of surgery using a fresh bar of anti-bacterial soap (such as Dial) and clean washcloth.  Dry with a clean towel and dress in clean clothing.  Ask your surgeon if you will be receiving antibiotics prior to surgery.  Make sure you, your family, and all healthcare providers clean their hands with soap and water or an alcohol based hand  before caring for you or your wound.    Day of surgery:  Your arrival time is approximately two hours before your scheduled surgery time.  Upon arrival, a Pre-op nurse and Anesthesiologist will review your health history, obtain vital signs, and answer questions you may have.  The only belongings needed at this time will be a list of your home medications and if applicable your C-PAP/BI-PAP machine.  A Pre-op nurse will start an IV and you may receive medication in preparation for surgery, including something to help you relax.     Please be aware that surgery does come with discomfort.  We want to make every effort to control your discomfort so please discuss any uncontrolled symptoms with your nurse.   Your doctor will most likely have prescribed pain medications.      If you are going home after surgery you will receive individualized written care instructions before being discharged.  A responsible adult must drive you to and from the hospital on the day of your surgery and ideally stay with you through the night.   .  Discharge prescriptions can be filled by the hospital pharmacy during regular pharmacy hours.  If you are having surgery late in the day/evening your prescription may be e-prescribed to your pharmacy.   Please verify your pharmacy hours or chose a 24 hour pharmacy to avoid not having access to your prescription because your pharmacy has closed for the day.    If you are staying overnight following surgery, you will be transported to your hospital room following the recovery period.  [facility] has all private rooms.    If you have any questions please call Pre-Admission Testing at [phone].  Deductibles and co-payments are collected on the day of service. Please be prepared to pay the required co-pay, deductible or deposit on the day of service as defined by your plan.      CHLORHEXIDINE CLOTH INSTRUCTIONS  The morning of surgery follow these instructions using the Chlorhexidine cloths you've been given.  These steps reduce bacteria on the body.  Do not use the cloths near your eyes, ears mouth, genitalia or on open wounds.  Throw the cloths away after use but do not try to flush them down a toilet.      Open and remove one cloth at a time from the package.    Leave the cloth unfolded and begin the bathing.  Massage the skin with the cloths using gentle pressure to remove bacteria.  Do not scrub harshly.   Follow the steps below with one 2% CHG cloth per area (6 total cloths).  One cloth for neck, shoulders and chest.  One cloth for both arms, hands, fingers and underarms (do underarms last).  One cloth for the abdomen followed by groin.  One cloth for right leg and foot including between the toes.  One cloth for left leg and foot including between the toes.  The last cloth is to be used for the back of the neck, back and buttocks.    Allow the CHG to air dry 3 minutes on the skin which will give it time to work and decrease the chance of irritation.  The skin may feel sticky until it is dry.  Do not rinse with water or any other liquid or you will lose the beneficial effects of the CHG.  If mild skin irritation occurs, do rinse the skin to remove the CHG.  Report this to the nurse at time of  admission.  Do not apply lotions, creams, ointments, deodorants or perfumes after using the clothes. Dress in clean clothes before coming to the hospital.    Call your surgeon immediately if you experience any of the following symptoms:  Sore Throat  Shortness of Breath or difficulty breathing  Cough  Chills  Body soreness or muscle pain  Headache  Fever  New loss of taste or smell  Do not arrive for your surgery ill.  Your procedure will need to be rescheduled to another time.  You will need to call your physician before the day of surgery to avoid any unnecessary exposure to hospital staff as well as other patients.

## 2024-03-06 ENCOUNTER — TELEPHONE (OUTPATIENT)
Dept: FAMILY MEDICINE CLINIC | Facility: CLINIC | Age: 49
End: 2024-03-06
Payer: COMMERCIAL

## 2024-03-06 NOTE — TELEPHONE ENCOUNTER
Caller: BLANCA/ FROM DR JEREZ    Relationship:     Best call back number: [phone]     [phone]       What was the call regarding:   BLANCA CALLED TO SEE IF YOU RECEIVED THE EKG REPORT FROM THEIR OFFICE     HE IS HAVING SURGERY ON 3-11 AND WANTED TO SEE IF HE WOULD BE CLEARED FOR SURGERY     CAN YOU CALL AND DISCUSS WITH HER     Is it okay if the provider responds through MyChart:

## 2024-03-06 NOTE — TELEPHONE ENCOUNTER
"Spoke with Perri in regards, let her know we did receive records from ortho, let her know that [facility] is out today and it would be tomorrow before she see's them and that patient would probably have to see cardiology for the clearance, Perri didn't know if we had any EKG\"s to compare to or anything, let her know that we do not, that patient only been seen here a couple times, so she stated pt needs to go else where for clearance then because his surgery is suppose to be on Monday and she hung up.   "

## 2024-03-11 ENCOUNTER — ANESTHESIA (OUTPATIENT)
Dept: PERIOP | Facility: HOSPITAL | Age: 49
End: 2024-03-11
Payer: COMMERCIAL

## 2024-03-11 ENCOUNTER — HOSPITAL ENCOUNTER (OUTPATIENT)
Facility: HOSPITAL | Age: 49
Discharge: HOME | End: 2024-03-12
Attending: ORTHOPAEDIC SURGERY | Admitting: ORTHOPAEDIC SURGERY
Payer: COMMERCIAL

## 2024-03-11 ENCOUNTER — APPOINTMENT (OUTPATIENT)
Dept: GENERAL RADIOLOGY | Facility: HOSPITAL | Age: 49
End: 2024-03-11
Payer: COMMERCIAL

## 2024-03-11 DIAGNOSIS — E29.1 HYPOGONADISM IN MALE: Primary | ICD-10-CM

## 2024-03-11 PROBLEM — Z96.649 HIP JOINT REPLACEMENT STATUS: Status: ACTIVE | Noted: 2024-03-11

## 2024-03-11 LAB
HCT VFR BLD AUTO: 40.6 % (ref 37.5–51)
HGB BLD-MCNC: 13.5 G/DL (ref 13–17.7)

## 2024-03-11 PROCEDURE — 25010000002 FENTANYL CITRATE (PF) 100 MCG/2ML SOLUTION: Performed by: NURSE ANESTHETIST, CERTIFIED REGISTERED

## 2024-03-11 PROCEDURE — C1776 JOINT DEVICE (IMPLANTABLE): HCPCS | Performed by: ORTHOPAEDIC SURGERY

## 2024-03-11 PROCEDURE — 25810000003 LACTATED RINGERS PER 1000 ML: Performed by: ANESTHESIOLOGY

## 2024-03-11 PROCEDURE — 25010000002 SUGAMMADEX 200 MG/2ML SOLUTION: Performed by: NURSE ANESTHETIST, CERTIFIED REGISTERED

## 2024-03-11 PROCEDURE — 72170 X-RAY EXAM OF PELVIS: CPT

## 2024-03-11 PROCEDURE — 97530 THERAPEUTIC ACTIVITIES: CPT

## 2024-03-11 PROCEDURE — 25010000002 ROPIVACAINE PER 1 MG: Performed by: ORTHOPAEDIC SURGERY

## 2024-03-11 PROCEDURE — 97161 PT EVAL LOW COMPLEX 20 MIN: CPT

## 2024-03-11 PROCEDURE — 25010000002 CLONIDINE PER 1 MG: Performed by: ORTHOPAEDIC SURGERY

## 2024-03-11 PROCEDURE — 25010000002 CEFAZOLIN IN DEXTROSE 2-4 GM/100ML-% SOLUTION: Performed by: ORTHOPAEDIC SURGERY

## 2024-03-11 PROCEDURE — 25010000002 PROPOFOL 200 MG/20ML EMULSION: Performed by: NURSE ANESTHETIST, CERTIFIED REGISTERED

## 2024-03-11 PROCEDURE — 76000 FLUOROSCOPY <1 HR PHYS/QHP: CPT

## 2024-03-11 PROCEDURE — 25010000002 ONDANSETRON PER 1 MG: Performed by: NURSE ANESTHETIST, CERTIFIED REGISTERED

## 2024-03-11 PROCEDURE — 25010000002 KETOROLAC TROMETHAMINE PER 15 MG: Performed by: ORTHOPAEDIC SURGERY

## 2024-03-11 PROCEDURE — 25010000002 FENTANYL CITRATE (PF) 50 MCG/ML SOLUTION: Performed by: NURSE ANESTHETIST, CERTIFIED REGISTERED

## 2024-03-11 PROCEDURE — G0378 HOSPITAL OBSERVATION PER HR: HCPCS

## 2024-03-11 PROCEDURE — 25010000002 VANCOMYCIN 1 G RECONSTITUTED SOLUTION: Performed by: ORTHOPAEDIC SURGERY

## 2024-03-11 PROCEDURE — 25010000002 ESMOLOL 100 MG/10ML SOLUTION: Performed by: NURSE ANESTHETIST, CERTIFIED REGISTERED

## 2024-03-11 PROCEDURE — 25010000002 HYDROMORPHONE PER 4 MG: Performed by: NURSE ANESTHETIST, CERTIFIED REGISTERED

## 2024-03-11 PROCEDURE — 25810000003 SODIUM CHLORIDE 0.9 % SOLUTION: Performed by: ORTHOPAEDIC SURGERY

## 2024-03-11 PROCEDURE — 25010000002 CEFAZOLIN PER 500 MG: Performed by: ORTHOPAEDIC SURGERY

## 2024-03-11 PROCEDURE — 25010000002 MIDAZOLAM PER 1 MG: Performed by: ANESTHESIOLOGY

## 2024-03-11 PROCEDURE — 85014 HEMATOCRIT: CPT | Performed by: ORTHOPAEDIC SURGERY

## 2024-03-11 PROCEDURE — 25010000002 EPINEPHRINE 1 MG/ML SOLUTION 30 ML VIAL: Performed by: ORTHOPAEDIC SURGERY

## 2024-03-11 PROCEDURE — 85018 HEMOGLOBIN: CPT | Performed by: ORTHOPAEDIC SURGERY

## 2024-03-11 DEVICE — IMPLANTABLE DEVICE: Type: IMPLANTABLE DEVICE | Status: FUNCTIONAL

## 2024-03-11 DEVICE — R3 3 HOLE ACETABULAR SHELL 54MM
Type: IMPLANTABLE DEVICE | Site: HIP | Status: FUNCTIONAL
Brand: R3 ACETABULAR

## 2024-03-11 DEVICE — OXINIUM FEMORAL HEAD 12/14 TAPER                                    36 MM M/+4
Type: IMPLANTABLE DEVICE | Site: HIP | Status: FUNCTIONAL
Brand: OXINIUM

## 2024-03-11 DEVICE — R3 0 DEGREE XLPE ACETABULAR LINER                                    36MM ID X OD 54MM
Type: IMPLANTABLE DEVICE | Site: HIP | Status: FUNCTIONAL
Brand: R3

## 2024-03-11 DEVICE — DEV CONTRL TISS STRATAFIX SPIRAL MNCRYL UD 3/0 PLS 30CM: Type: IMPLANTABLE DEVICE | Site: HIP | Status: FUNCTIONAL

## 2024-03-11 DEVICE — POLARSTEM STANDARD NON-CEMENTED                                    WITH TI/HA 0
Type: IMPLANTABLE DEVICE | Site: HIP | Status: FUNCTIONAL
Brand: POLARSTEM

## 2024-03-11 RX ORDER — VANCOMYCIN HYDROCHLORIDE 1 G/20ML
INJECTION, POWDER, LYOPHILIZED, FOR SOLUTION INTRAVENOUS AS NEEDED
Status: DISCONTINUED | OUTPATIENT
Start: 2024-03-11 | End: 2024-03-11 | Stop reason: HOSPADM

## 2024-03-11 RX ORDER — FAMOTIDINE 10 MG/ML
20 INJECTION, SOLUTION INTRAVENOUS ONCE
Status: COMPLETED | OUTPATIENT
Start: 2024-03-11 | End: 2024-03-11

## 2024-03-11 RX ORDER — ACETAMINOPHEN 500 MG
1000 TABLET ORAL ONCE
Status: COMPLETED | OUTPATIENT
Start: 2024-03-11 | End: 2024-03-11

## 2024-03-11 RX ORDER — ESMOLOL HYDROCHLORIDE 10 MG/ML
INJECTION INTRAVENOUS AS NEEDED
Status: DISCONTINUED | OUTPATIENT
Start: 2024-03-11 | End: 2024-03-11 | Stop reason: SURG

## 2024-03-11 RX ORDER — HYDRALAZINE HYDROCHLORIDE 20 MG/ML
5 INJECTION INTRAMUSCULAR; INTRAVENOUS
Status: DISCONTINUED | OUTPATIENT
Start: 2024-03-11 | End: 2024-03-11 | Stop reason: HOSPADM

## 2024-03-11 RX ORDER — OXYCODONE AND ACETAMINOPHEN 7.5; 325 MG/1; MG/1
1 TABLET ORAL EVERY 4 HOURS PRN
Status: DISCONTINUED | OUTPATIENT
Start: 2024-03-11 | End: 2024-03-11 | Stop reason: HOSPADM

## 2024-03-11 RX ORDER — WOUND DRESSING ADHESIVE - LIQUID
LIQUID MISCELLANEOUS AS NEEDED
Status: DISCONTINUED | OUTPATIENT
Start: 2024-03-11 | End: 2024-03-11 | Stop reason: HOSPADM

## 2024-03-11 RX ORDER — ASPIRIN 81 MG/1
81 TABLET ORAL EVERY 12 HOURS SCHEDULED
Status: DISCONTINUED | OUTPATIENT
Start: 2024-03-12 | End: 2024-03-12 | Stop reason: HOSPADM

## 2024-03-11 RX ORDER — MELOXICAM 15 MG/1
15 TABLET ORAL DAILY
Status: DISCONTINUED | OUTPATIENT
Start: 2024-03-11 | End: 2024-03-12 | Stop reason: HOSPADM

## 2024-03-11 RX ORDER — CELECOXIB 200 MG/1
200 CAPSULE ORAL ONCE
Status: COMPLETED | OUTPATIENT
Start: 2024-03-11 | End: 2024-03-11

## 2024-03-11 RX ORDER — FENTANYL CITRATE 50 UG/ML
INJECTION, SOLUTION INTRAMUSCULAR; INTRAVENOUS AS NEEDED
Status: DISCONTINUED | OUTPATIENT
Start: 2024-03-11 | End: 2024-03-11 | Stop reason: SURG

## 2024-03-11 RX ORDER — SODIUM CHLORIDE 9 MG/ML
100 INJECTION, SOLUTION INTRAVENOUS CONTINUOUS
Status: DISCONTINUED | OUTPATIENT
Start: 2024-03-11 | End: 2024-03-12 | Stop reason: HOSPADM

## 2024-03-11 RX ORDER — SODIUM CHLORIDE 0.9 % (FLUSH) 0.9 %
3-10 SYRINGE (ML) INJECTION AS NEEDED
Status: DISCONTINUED | OUTPATIENT
Start: 2024-03-11 | End: 2024-03-11 | Stop reason: HOSPADM

## 2024-03-11 RX ORDER — OXYCODONE HYDROCHLORIDE AND ACETAMINOPHEN 5; 325 MG/1; MG/1
1 TABLET ORAL EVERY 4 HOURS PRN
Status: DISCONTINUED | OUTPATIENT
Start: 2024-03-11 | End: 2024-03-12 | Stop reason: HOSPADM

## 2024-03-11 RX ORDER — CEFAZOLIN SODIUM 2 G/100ML
2000 INJECTION, SOLUTION INTRAVENOUS ONCE
Status: COMPLETED | OUTPATIENT
Start: 2024-03-11 | End: 2024-03-11

## 2024-03-11 RX ORDER — MIDAZOLAM HYDROCHLORIDE 1 MG/ML
1 INJECTION INTRAMUSCULAR; INTRAVENOUS
Status: DISCONTINUED | OUTPATIENT
Start: 2024-03-11 | End: 2024-03-11 | Stop reason: HOSPADM

## 2024-03-11 RX ORDER — KETAMINE HCL IN NACL, ISO-OSM 100MG/10ML
SYRINGE (ML) INJECTION AS NEEDED
Status: DISCONTINUED | OUTPATIENT
Start: 2024-03-11 | End: 2024-03-11 | Stop reason: SURG

## 2024-03-11 RX ORDER — DOCUSATE SODIUM 100 MG/1
100 CAPSULE, LIQUID FILLED ORAL 2 TIMES DAILY PRN
Status: DISCONTINUED | OUTPATIENT
Start: 2024-03-11 | End: 2024-03-12 | Stop reason: HOSPADM

## 2024-03-11 RX ORDER — EPHEDRINE SULFATE 50 MG/ML
INJECTION INTRAVENOUS AS NEEDED
Status: DISCONTINUED | OUTPATIENT
Start: 2024-03-11 | End: 2024-03-11 | Stop reason: SURG

## 2024-03-11 RX ORDER — ONDANSETRON 2 MG/ML
INJECTION INTRAMUSCULAR; INTRAVENOUS AS NEEDED
Status: DISCONTINUED | OUTPATIENT
Start: 2024-03-11 | End: 2024-03-11 | Stop reason: SURG

## 2024-03-11 RX ORDER — PROMETHAZINE HYDROCHLORIDE 25 MG/1
25 TABLET ORAL ONCE AS NEEDED
Status: DISCONTINUED | OUTPATIENT
Start: 2024-03-11 | End: 2024-03-11 | Stop reason: HOSPADM

## 2024-03-11 RX ORDER — HYDROMORPHONE HYDROCHLORIDE 1 MG/ML
0.5 INJECTION, SOLUTION INTRAMUSCULAR; INTRAVENOUS; SUBCUTANEOUS
Status: DISCONTINUED | OUTPATIENT
Start: 2024-03-11 | End: 2024-03-11 | Stop reason: HOSPADM

## 2024-03-11 RX ORDER — EPHEDRINE SULFATE 50 MG/ML
5 INJECTION, SOLUTION INTRAVENOUS ONCE AS NEEDED
Status: DISCONTINUED | OUTPATIENT
Start: 2024-03-11 | End: 2024-03-11 | Stop reason: HOSPADM

## 2024-03-11 RX ORDER — IPRATROPIUM BROMIDE AND ALBUTEROL SULFATE 2.5; .5 MG/3ML; MG/3ML
3 SOLUTION RESPIRATORY (INHALATION) ONCE AS NEEDED
Status: DISCONTINUED | OUTPATIENT
Start: 2024-03-11 | End: 2024-03-11 | Stop reason: HOSPADM

## 2024-03-11 RX ORDER — DIPHENHYDRAMINE HYDROCHLORIDE 50 MG/ML
12.5 INJECTION INTRAMUSCULAR; INTRAVENOUS
Status: DISCONTINUED | OUTPATIENT
Start: 2024-03-11 | End: 2024-03-11 | Stop reason: HOSPADM

## 2024-03-11 RX ORDER — LEVOTHYROXINE SODIUM 0.05 MG/1
50 TABLET ORAL DAILY
Status: DISCONTINUED | OUTPATIENT
Start: 2024-03-11 | End: 2024-03-12 | Stop reason: HOSPADM

## 2024-03-11 RX ORDER — LIDOCAINE HYDROCHLORIDE 10 MG/ML
0.5 INJECTION, SOLUTION INFILTRATION; PERINEURAL ONCE AS NEEDED
Status: DISCONTINUED | OUTPATIENT
Start: 2024-03-11 | End: 2024-03-11 | Stop reason: HOSPADM

## 2024-03-11 RX ORDER — HYDROCODONE BITARTRATE AND ACETAMINOPHEN 5; 325 MG/1; MG/1
1 TABLET ORAL ONCE AS NEEDED
Status: COMPLETED | OUTPATIENT
Start: 2024-03-11 | End: 2024-03-11

## 2024-03-11 RX ORDER — NALOXONE HCL 0.4 MG/ML
0.1 VIAL (ML) INJECTION
Status: DISCONTINUED | OUTPATIENT
Start: 2024-03-11 | End: 2024-03-12 | Stop reason: HOSPADM

## 2024-03-11 RX ORDER — FENTANYL CITRATE 50 UG/ML
50 INJECTION, SOLUTION INTRAMUSCULAR; INTRAVENOUS
Status: DISCONTINUED | OUTPATIENT
Start: 2024-03-11 | End: 2024-03-11 | Stop reason: HOSPADM

## 2024-03-11 RX ORDER — DROPERIDOL 2.5 MG/ML
0.62 INJECTION, SOLUTION INTRAMUSCULAR; INTRAVENOUS
Status: DISCONTINUED | OUTPATIENT
Start: 2024-03-11 | End: 2024-03-11 | Stop reason: HOSPADM

## 2024-03-11 RX ORDER — FENTANYL CITRATE 50 UG/ML
50 INJECTION, SOLUTION INTRAMUSCULAR; INTRAVENOUS ONCE AS NEEDED
Status: DISCONTINUED | OUTPATIENT
Start: 2024-03-11 | End: 2024-03-11 | Stop reason: HOSPADM

## 2024-03-11 RX ORDER — ONDANSETRON 2 MG/ML
4 INJECTION INTRAMUSCULAR; INTRAVENOUS ONCE AS NEEDED
Status: DISCONTINUED | OUTPATIENT
Start: 2024-03-11 | End: 2024-03-11 | Stop reason: HOSPADM

## 2024-03-11 RX ORDER — SODIUM CHLORIDE 0.9 % (FLUSH) 0.9 %
3 SYRINGE (ML) INJECTION EVERY 12 HOURS SCHEDULED
Status: DISCONTINUED | OUTPATIENT
Start: 2024-03-11 | End: 2024-03-11 | Stop reason: HOSPADM

## 2024-03-11 RX ORDER — NALOXONE HCL 0.4 MG/ML
0.2 VIAL (ML) INJECTION AS NEEDED
Status: DISCONTINUED | OUTPATIENT
Start: 2024-03-11 | End: 2024-03-11 | Stop reason: HOSPADM

## 2024-03-11 RX ORDER — ROCURONIUM BROMIDE 10 MG/ML
INJECTION, SOLUTION INTRAVENOUS AS NEEDED
Status: DISCONTINUED | OUTPATIENT
Start: 2024-03-11 | End: 2024-03-11 | Stop reason: SURG

## 2024-03-11 RX ORDER — FAMOTIDINE 20 MG/1
40 TABLET, FILM COATED ORAL DAILY
Status: DISCONTINUED | OUTPATIENT
Start: 2024-03-11 | End: 2024-03-12 | Stop reason: HOSPADM

## 2024-03-11 RX ORDER — PROMETHAZINE HYDROCHLORIDE 25 MG/1
25 SUPPOSITORY RECTAL ONCE AS NEEDED
Status: DISCONTINUED | OUTPATIENT
Start: 2024-03-11 | End: 2024-03-11 | Stop reason: HOSPADM

## 2024-03-11 RX ORDER — FLUMAZENIL 0.1 MG/ML
0.2 INJECTION INTRAVENOUS AS NEEDED
Status: DISCONTINUED | OUTPATIENT
Start: 2024-03-11 | End: 2024-03-11 | Stop reason: HOSPADM

## 2024-03-11 RX ORDER — ONDANSETRON 2 MG/ML
4 INJECTION INTRAMUSCULAR; INTRAVENOUS EVERY 6 HOURS PRN
Status: DISCONTINUED | OUTPATIENT
Start: 2024-03-11 | End: 2024-03-12 | Stop reason: HOSPADM

## 2024-03-11 RX ORDER — MAGNESIUM HYDROXIDE 1200 MG/15ML
LIQUID ORAL AS NEEDED
Status: DISCONTINUED | OUTPATIENT
Start: 2024-03-11 | End: 2024-03-11 | Stop reason: HOSPADM

## 2024-03-11 RX ORDER — PROPOFOL 10 MG/ML
INJECTION, EMULSION INTRAVENOUS AS NEEDED
Status: DISCONTINUED | OUTPATIENT
Start: 2024-03-11 | End: 2024-03-11 | Stop reason: SURG

## 2024-03-11 RX ORDER — LABETALOL HYDROCHLORIDE 5 MG/ML
5 INJECTION, SOLUTION INTRAVENOUS
Status: DISCONTINUED | OUTPATIENT
Start: 2024-03-11 | End: 2024-03-11 | Stop reason: HOSPADM

## 2024-03-11 RX ORDER — ONDANSETRON 4 MG/1
4 TABLET, ORALLY DISINTEGRATING ORAL EVERY 6 HOURS PRN
Status: DISCONTINUED | OUTPATIENT
Start: 2024-03-11 | End: 2024-03-12 | Stop reason: HOSPADM

## 2024-03-11 RX ORDER — SODIUM CHLORIDE, SODIUM LACTATE, POTASSIUM CHLORIDE, CALCIUM CHLORIDE 600; 310; 30; 20 MG/100ML; MG/100ML; MG/100ML; MG/100ML
9 INJECTION, SOLUTION INTRAVENOUS CONTINUOUS
Status: DISCONTINUED | OUTPATIENT
Start: 2024-03-11 | End: 2024-03-12 | Stop reason: HOSPADM

## 2024-03-11 RX ORDER — LIDOCAINE HYDROCHLORIDE 20 MG/ML
INJECTION, SOLUTION EPIDURAL; INFILTRATION; INTRACAUDAL; PERINEURAL AS NEEDED
Status: DISCONTINUED | OUTPATIENT
Start: 2024-03-11 | End: 2024-03-11 | Stop reason: SURG

## 2024-03-11 RX ORDER — TRANEXAMIC ACID 100 MG/ML
INJECTION, SOLUTION INTRAVENOUS AS NEEDED
Status: DISCONTINUED | OUTPATIENT
Start: 2024-03-11 | End: 2024-03-11 | Stop reason: SURG

## 2024-03-11 RX ORDER — OXYCODONE HYDROCHLORIDE AND ACETAMINOPHEN 5; 325 MG/1; MG/1
2 TABLET ORAL EVERY 4 HOURS PRN
Status: DISCONTINUED | OUTPATIENT
Start: 2024-03-11 | End: 2024-03-12 | Stop reason: HOSPADM

## 2024-03-11 RX ADMIN — ROCURONIUM BROMIDE 50 MG: 10 INJECTION, SOLUTION INTRAVENOUS at 07:03

## 2024-03-11 RX ADMIN — CELECOXIB 200 MG: 200 CAPSULE ORAL at 05:56

## 2024-03-11 RX ADMIN — FENTANYL CITRATE 50 MCG: 50 INJECTION, SOLUTION INTRAMUSCULAR; INTRAVENOUS at 07:39

## 2024-03-11 RX ADMIN — EPHEDRINE SULFATE 10 MG: 50 INJECTION INTRAVENOUS at 07:49

## 2024-03-11 RX ADMIN — OXYCODONE AND ACETAMINOPHEN 2 TABLET: 5; 325 TABLET ORAL at 20:34

## 2024-03-11 RX ADMIN — FAMOTIDINE 20 MG: 10 INJECTION INTRAVENOUS at 06:18

## 2024-03-11 RX ADMIN — HYDROMORPHONE HYDROCHLORIDE 0.5 MG: 1 INJECTION, SOLUTION INTRAMUSCULAR; INTRAVENOUS; SUBCUTANEOUS at 08:50

## 2024-03-11 RX ADMIN — PROPOFOL 50 MG: 10 INJECTION, EMULSION INTRAVENOUS at 07:12

## 2024-03-11 RX ADMIN — ACETAMINOPHEN 1000 MG: 500 TABLET ORAL at 05:56

## 2024-03-11 RX ADMIN — SODIUM CHLORIDE 2000 MG: 900 INJECTION INTRAVENOUS at 16:57

## 2024-03-11 RX ADMIN — FENTANYL CITRATE 50 MCG: 50 INJECTION, SOLUTION INTRAMUSCULAR; INTRAVENOUS at 07:20

## 2024-03-11 RX ADMIN — ONDANSETRON 4 MG: 2 INJECTION INTRAMUSCULAR; INTRAVENOUS at 07:28

## 2024-03-11 RX ADMIN — FENTANYL CITRATE 50 MCG: 50 INJECTION, SOLUTION INTRAMUSCULAR; INTRAVENOUS at 09:05

## 2024-03-11 RX ADMIN — FENTANYL CITRATE 50 MCG: 50 INJECTION, SOLUTION INTRAMUSCULAR; INTRAVENOUS at 08:25

## 2024-03-11 RX ADMIN — Medication 50 MG: at 07:13

## 2024-03-11 RX ADMIN — LIDOCAINE HYDROCHLORIDE 100 MG: 20 INJECTION, SOLUTION EPIDURAL; INFILTRATION; INTRACAUDAL; PERINEURAL at 07:03

## 2024-03-11 RX ADMIN — MIDAZOLAM 1 MG: 1 INJECTION INTRAMUSCULAR; INTRAVENOUS at 06:28

## 2024-03-11 RX ADMIN — PROPOFOL 220 MG: 10 INJECTION, EMULSION INTRAVENOUS at 07:03

## 2024-03-11 RX ADMIN — OXYCODONE AND ACETAMINOPHEN 2 TABLET: 5; 325 TABLET ORAL at 12:26

## 2024-03-11 RX ADMIN — SODIUM CHLORIDE 100 ML/HR: 9 INJECTION, SOLUTION INTRAVENOUS at 11:10

## 2024-03-11 RX ADMIN — TRANEXAMIC ACID 1000 MG: 100 INJECTION INTRAVENOUS at 07:20

## 2024-03-11 RX ADMIN — TRANEXAMIC ACID 1000 MG: 100 INJECTION INTRAVENOUS at 07:54

## 2024-03-11 RX ADMIN — HYDROCODONE BITARTRATE AND ACETAMINOPHEN 1 TABLET: 5; 325 TABLET ORAL at 08:50

## 2024-03-11 RX ADMIN — SODIUM CHLORIDE, POTASSIUM CHLORIDE, SODIUM LACTATE AND CALCIUM CHLORIDE 9 ML/HR: 600; 310; 30; 20 INJECTION, SOLUTION INTRAVENOUS at 06:19

## 2024-03-11 RX ADMIN — SUGAMMADEX 200 MG: 100 INJECTION, SOLUTION INTRAVENOUS at 07:59

## 2024-03-11 RX ADMIN — CEFAZOLIN SODIUM 2000 MG: 2 INJECTION, SOLUTION INTRAVENOUS at 06:54

## 2024-03-11 RX ADMIN — OXYCODONE AND ACETAMINOPHEN 2 TABLET: 5; 325 TABLET ORAL at 16:57

## 2024-03-11 RX ADMIN — ESMOLOL HYDROCHLORIDE 50 MG: 10 INJECTION, SOLUTION INTRAVENOUS at 07:03

## 2024-03-11 NOTE — NURSING NOTE
1mg of versad given to patient. Patient states felt lightheaded and feeling sweaty and hot. Patient pale. BP 74/50's and heart rate in the 30's, Anesthesia notified and aware. Fluids wide open. BP better 110's/50's and heart rate in the 60's

## 2024-03-11 NOTE — OP NOTE
Anterior Total Hip Operative Note  Dr. HUNTER Dawson II  [phone]    PATIENT NAME: Chris Thomas Jr.  MRN: [id number]  : 1975 AGE: 48 y.o. GENDER: male  DATE OF OPERATION: 3/11/2024  PREOPERATIVE DIAGNOSIS: End stage Osteoarthritis  POSTOPERATIVE DIAGNOSIS: Same  OPERATION PERFORMED: Right Anterior Total Hip Arthroplasty  SURGEON: Kasi Dawson MD  Circulator: Ihsan Luna RN; Royal Ortiz RN  Radiology Technologist: Dulce Woods RRT  Scrub Person: Connie Hudson PCT  Vendor Representative: Casey Hancock  Orderly: Mony Baker  Assistant: Jo-Ann Darden PA  ANESTHESIA: General  ASSISTANT: YUNIOR Ortiz. This case would not have been possible without another set of skilled surgical hands for retraction, use of instrumentation, and general assistance.  This assistance was vital to the success of the case.   ESTIMATED BLOOD LOSS: 250cc  SPONGE AND NEEDLE COUNT: Correct  INDICATIONS:  Arthritis: This patient was noted to have severe arthritis affecting the operative hip. They failed conservative management and elected to undergo total hip replacement. A discussion of operative versus nonoperative treatment was had. They elected to undergo anterior total hip arthroplasty. The risks of surgery were discussed and included the risk of anesthesia, infection, damage to neurovascular structures, implant loosening/failure, fracture, hardware prominence, dislocation, the need for further procedures, medical complications, and others. No guarantees were made. The patient wished to proceed with surgery and a surgical consent was signed.  COMPONENTS:   Acetabular Cup: Smith [employer] Nephew R3 acetabular cup: 54 Outer Diameter  Cup Screws: Smith & Nephew 6.5 mm screws: No Screws Were Used  Smith [employer] Nephew Neutral Acetabular Liner: Neutral  Smith [employer] Nephew Polar Femoral Stem: Size 0  Smith [employer] Nephew Oxinium Head: 36mm +0    PERTINENT FINDINGS: Arthritis: Endstage degenerative arthritis of  the femoral head and acetabulum.    DETAILS OF PROCEDURE:   The patient was met in the preoperative area. The site was marked. The consent and H&P were reviewed. The patient was then wheeled back to the operative suite underwent anesthesia. The [place] table boots were secured to the patients’ feet. The patient was moved onto the [place] table and secured in the supine position. The perineal post was inserted and the boots were secured into the leg holders. Surgical alcohol was used to thoroughly clean the operative area.     The hip and leg was then prepped in the normal sterile fashion, multiple layers of sterile drapes, and surgical space suits for the entire operative team. New outer gloves were used by all sterile surgical team members after final draping. The surgical incision was marked. A surgical timeout was performed.    A Modified Murcia-Sherwood anterior approach was used. Dissection was carried down to the fascia. The fascia was incised and the tensor fascia daxa muscle was retracted laterally and the sartorius medially. The lateral femoral circumflex vessels were identified and cauterized using bovie. The rectus femoris was retracted medially. A capsulotomy was then performed. The capsule was tagged with Ethibond for later repair. Retractors were placed on either side of the femoral neck and dissection was further carried down so that the lesser trochanter could be palpated and the superior rim of the acetabulum could be visualized.    The femoral neck cut was then made from  just proximal to the lesser trochanter medially headed towards the saddle laterally. Care was taken not to extend the cut into the lesser or greater trochanters. The head and neck segment were removed with a corkscrew. The acetabulum was then exposed. The labrum was removed using a kocher and scalpel and excess osteophytes were removed using an osteotome.    The acetabulum was progressively reamed, beginning with medialization and then  finalizing the position of the reamer to approximate the final cup position. Fluoroscopy was used to ensure proper placement of the reamer, including adequate medialization as well as appropriate abduction and anteversion. The real cup was then opened and inserted using fluoroscopy, ensuring good position in terms of abduction and anteversion.     No Screws: Initial press-fit fixation of the cup was very robust and no screws were required for supplemental fixation.    After thorough irrigation and ensuring that no soft tissue was entrapped within the cup, the real liner was snapped into place.    The hook was placed just distal to the greater trochanter. The femur was then externally rotated, extended and adducted under the well leg. Soft tissue releases were performed to gain exposure to the proximal femur. Capsule was released from the saddle and the lateral femur. Care was taken to preserve the short external rotator tendons. The capsule was also released along the medial femur. The hydraulic femoral lift was then used to better expose the femur. Further soft tissue releases were then performed, again ensuring preservation of the short external rotators.    The femur was machined with a cookie cutter osteotome and then a rasp was used to further lateralize the starting point. The sclerotic bone on the lateral shoulder of the femur was removed with a rongeour and curette as needed to protect the greater trochanter. Progressive broaches were inserted until adequate fill had been achieved. Using fluoroscopy, the femoral stem was visualized after trial reduction of the hip. The length and offset were compared to the non-operative hip. Trials of stem size and neck length were trialed until equal leg length and offset were obtained. Additionally hip stability was tested with internal and external rotation of the leg. The leg was stable with at least 90° of external rotation and there was no impingement at 60° of  "internal rotation. After implantation of the final stem and ball, the leg was once again brought into normal anatomic position and relocated. Final x-rays were taken with final implants noting good position of the stem and cup, and no visualized fracture.. The hip was stable upon reduction.    An analgesic cocktail was then injected about the hip as well as the surgical dissection area. The capsule was closed.  The fascia was then closed with a running stitch and the skin was closed in layers.  A sterile dressing was applied.    The patient was moved from the [place] table to the [facility] where the boots were removed. The patient was taken to the recovery room in stable condition. There were no complications and the patient tolerated the procedure well.    R \"Jens\" Eunice PARHAM MD  Orthopaedic Surgery  [facility]  [phone]            "

## 2024-03-11 NOTE — PLAN OF CARE
Goal Outcome Evaluation:         Vss, nvi, ZEENAT dressing with small amount of drainage, ambulating assist x1, voiding per brp, prn percocet given for pain, plans to d/c home tomorrow, educated on bp meds and monitoring.

## 2024-03-11 NOTE — DISCHARGE PLACEMENT REQUEST
"Jill Vela Jr. (48 y.o. Male)       Date of Birth   1975    Social Security Number       Address   [facility] [address] [place] [facility]    Home Phone   [phone]    MRN   [id number]       Restorationism   None    Marital Status                               Admission Date   3/11/24    Admission Type   Elective    Admitting Provider   Kasi Dawson II, MD    Attending Provider   Kasi Dawson II, MD    Department, Room/Bed   [address], 84/1       Discharge Date       Discharge Disposition       Discharge Destination                                 Attending Provider: Kasi Dawson II, MD    Allergies: Semaglutide(0.25 Or 0.5mg-dos), Penicillins    Isolation: None   Infection: None   Code Status: Not on file    Ht: 170.2 cm (67.01\")   Wt: 113 kg (249 lb 1.9 oz)    Admission Cmt: None   Principal Problem: Hip joint replacement status [Z96.649]                   Active Insurance as of 3/11/2024       Primary Coverage       Payor Plan Insurance Group Employer/Plan Group    [employer] [id number]       Payor Plan Address Payor Plan Phone Number Payor Plan Fax Number Effective Dates    [address] [phone]  1/1/2018 - None Entered    [facility] [id number]         Subscriber Name Subscriber Birth Date Member ID       JILL VELA JR. 1975 [id number]                     Emergency Contacts        (Rel.) Home Phone Work Phone Mobile Phone    WilliamYuli (Mother) [phone] -- [phone]    FIONA SOUSA (Spouse) [phone] -- [phone]                "

## 2024-03-11 NOTE — ANESTHESIA PROCEDURE NOTES
Airway  Urgency: elective    Date/Time: 3/11/2024 7:08 AM  Airway not difficult    General Information and Staff    Patient location during procedure: OR  Anesthesiologist: Seng Turner DO  CRNA/CAA: Shira Dela Cruz CRNA    Indications and Patient Condition  Indications for airway management: airway protection    Preoxygenated: yes  Mask difficulty assessment: 2 - vent by mask + OA or adjuvant +/- NMBA    Final Airway Details  Final airway type: endotracheal airway      Successful airway: ETT  Cuffed: yes   Successful intubation technique: direct laryngoscopy  Facilitating devices/methods: intubating stylet  Endotracheal tube insertion site: oral  Blade: Richards  Blade size: 2  ETT size (mm): 7.5  Cormack-Lehane Classification: grade IIa - partial view of glottis  Placement verified by: capnometry   Measured from: lips  ETT/EBT  to lips (cm): 23  Number of attempts at approach: 1  Assessment: lips, teeth, and gum same as pre-op and atraumatic intubation

## 2024-03-11 NOTE — ANESTHESIA POSTPROCEDURE EVALUATION
Patient: Chris Thomas Jr.    Procedure Summary       Date: 03/11/24 Room / Location:  [facility] OR [address] [place] [address] [facility]    Anesthesia Start: 0658 Anesthesia Stop: 0814    Procedure: TOTAL HIP ARTHROPLASTY ANTERIOR WITH HANA TABLE (Right: Hip) Diagnosis:     Surgeons: Kasi Dawson II, MD Provider: Seng Turner DO    Anesthesia Type: general ASA Status: 3            Anesthesia Type: general    Vitals  Vitals Value Taken Time   /88 03/11/24 0932   Temp 36.8 °C (98.2 °F) 03/11/24 0930   Pulse 84 03/11/24 0942   Resp 18 03/11/24 0915   SpO2 98 % 03/11/24 0942   Vitals shown include unfiled device data.        Post Anesthesia Care and Evaluation    Patient location during evaluation: bedside  Patient participation: complete - patient participated  Level of consciousness: awake and alert  Pain management: adequate    Airway patency: patent  Anesthetic complications: No anesthetic complications  PONV Status: controlled  Cardiovascular status: acceptable and hemodynamically stable  Respiratory status: acceptable, spontaneous ventilation and nonlabored ventilation  Hydration status: acceptable    Comments: /88   Pulse 78   Temp 36.8 °C (98.2 °F) (Oral)   Resp 18   SpO2 99%

## 2024-03-11 NOTE — H&P
Orthopaedic Surgery  History & Physical For Elective Total Hip  Dr. HUNTER Begum” Eunice II  [phone]    HPI:  Patient is a 48 y.o. Not  or  male who presents with End-stage arthritis of the right hip.  They failed conservative treatment of their hip pain and a thorough discussion of the risks and benefits of surgery was had.  The patient wishes to continue with elective total hip replacement, they were scheduled and are here for surgery. They did get medical clearance as well as a thorough preoperative workup.     MEDICAL HISTORY  Past Medical History:   Diagnosis Date    At risk for sleep apnea     STOP BANG 5    ED (erectile dysfunction)     Environmental allergies     tested in youth, no prior immunotherapy    Erectile dysfunction     Fracture, clavicle 1984    Broken collar bone as a child    Hip arthrosis 10/2022    This is the reason for my visit    Hypertension     MEDS IN THE PAST WITH WT LOSS.    Hypothyroidism     Low back pain 1/1/1998    Back injury.  Sporadic pain from excessive activity    Low back strain 1995    Work injury - [employer]    Low testosterone     Obesity 1/1/2000    SARAH (obstructive sleep apnea)     resolved after uvulectomy and T & A in 2005    Osgood-Schlatter's disease 1994    Never officially diagnosed. But was very symptomatic    Primary osteoarthritis of left knee     Vitamin D deficiency      Past Surgical History:   Procedure Laterality Date    ADENOIDECTOMY  2004    TONSILLECTOMY AND ADENOIDECTOMY  2005    Dr. Martinez, done for sleep apnea    UVULECTOMY  2005    for SARAH     Prior to Admission medications    Medication Sig Start Date End Date Taking? Authorizing Provider   levothyroxine (Synthroid) 50 MCG tablet Take 1 tablet by mouth Daily. 2/6/24  Yes Shelia Cummings APRN   traMADol-acetaminophen (ULTRACET) 37.5-325 MG per tablet Take 1 tablet by mouth Every 8 (Eight) Hours As Needed for Moderate Pain. 1/29/24  Yes Shelia Cummings APRN   cetirizine (zyrTEC) 10  MG tablet Take 1 tablet by mouth Daily As Needed for Allergies or Rhinitis.    Provider, MD Pradeep   ibuprofen (ADVIL,MOTRIN) 800 MG tablet TAKE 1 TABLET BY MOUTH EVERY 8 (EIGHT) HOURS AS NEEDED FOR MILD PAIN.  Patient taking differently: Take 1 tablet by mouth Every 8 (Eight) Hours As Needed for Mild Pain. WILL HOLD PER MD ORDERS 2/16/24   Shelia Cummings APRN   methylPREDNISolone (MEDROL) 4 MG dose pack Use as directed by package instructions 1/16/24   Rishi Olmedo II,    sildenafil (Viagra) 50 MG tablet Take 1 tablet by mouth Daily As Needed for Erectile Dysfunction.  Patient taking differently: Take 1 tablet by mouth Daily As Needed for Erectile Dysfunction. TO BE HELD 48 HOURS PRIOR TO SURGERY 2/6/24   Shelia Cummings APRN     Allergies   Allergen Reactions    Semaglutide(0.25 Or 0.5mg-Dos) Nausea And Vomiting    Penicillins Hives and Rash     CHILDHOOD. DOES FINE WITH CEPHALSPORINS     Most Recent Immunizations   Administered Date(s) Administered    COVID-19 (PFIZER) Purple Cap Monovalent 01/22/2021    Flu Vaccine Quad PF >36MO 10/11/2017    Fluzone (or Fluarix & Flulaval for VFC) >6mos 11/14/2023    Fluzone Quad >6mos (Multi-dose) 09/01/2019    Hepatitis A 10/22/2018    Tdap 10/09/2019     Social History     Tobacco Use    Smoking status: Never    Smokeless tobacco: Never    Tobacco comments:     Never smoked or used tobacco of any kind   Substance Use Topics    Alcohol use: Not Currently     Comment: rare occassion      Social History     Substance and Sexual Activity   Drug Use No       REVIEW OF SYSTEMS:  Head: negative for headache  Respiratory: negative for shortness of breath.   Cardiovascular: negative for chest pain.   Gastrointestinal: negative abdominal pain.   Neurological: negative for LOC  Psychiatric/Behavioral: negative for memory loss.   All other systems reviewed and are negative    VITALS: BP (!) 153/112 (BP Location: Left arm, Patient Position: Sitting)   Pulse  75   Resp 16   SpO2 98%  There is no height or weight on file to calculate BMI.    PHYSICAL EXAM:   CONSTITUTIONAL: A&Ox3, No acute distress  LUNGS: Equal chest rise, no shortness of air  CARDIOVASCULAR: palpable peripheral pulses  SKIN: no skin lesions in the area examined  LYMPH: no lymphadenopathy in the area examined  EXTREMITY: Hip  Pulses:  Brisk Capillary Refill  Sensation: Intact to Saphenous, Sural, Deep Peroneal, Superficial Peroneal, and Tibial Nerves and grossly throughout extremity  Motor: 5/5 EHL/FHL/TA/GS motor complexes    RADIOLOGY REVIEW:   No radiology results for the last 7 days    LABS:   Results for the past 24 hours: No results found for this or any previous visit (from the past 24 hour(s)).    IMPRESSION:  Patient is a 48 y.o. Not  or  male with end-stage osteoarthritis of the right hip    PLAN:   Surgery: Elective total hip arthroplasty  Consent: The risks and benefits of operative versus nonoperative treatment were discussed. The patient elected to undergo operative treatment of their hip. The risks discussed included but were not limited to blood clots, MI, stroke, other medical complications, infection, dislocation, fracture, damage to neurovascular structures, continued pain, hardware prominence, loss of range of motion, stiffness, need for further procedures, and and risk of anesthesia. No guarantees were made   Disposition: Elective right Total Hip Arthroplasty today.    Kasi Dawson II, MD  Orthopaedic Surgery  [facility]

## 2024-03-11 NOTE — ANESTHESIA PREPROCEDURE EVALUATION
Anesthesia Evaluation     Patient summary reviewed   no history of anesthetic complications:   NPO Solid Status: > 8 hours  NPO Liquid Status: > 2 hours           Airway   Dental      Pulmonary    (+) ,sleep apnea (improved following UPPP)  (-) shortness of breath, recent URI, not a smoker  Cardiovascular   Exercise tolerance: good (4-7 METS)    ECG reviewed    (+) hypertension, dysrhythmias PVC  (-) past MI, angina      Neuro/Psych  (-) seizures, CVA  GI/Hepatic/Renal/Endo    (+) morbid obesity, thyroid problem hypothyroidismRenal disease: Cr 0.97. Diabetes: IFG, last HbA1c 5.5.    Musculoskeletal     (+) back pain  (-) neck stiffness  Abdominal    Substance History      OB/GYN          Other   arthritis,     (-) blood dyscrasia (Hb 14.8)                Anesthesia Plan    ASA 3     general     intravenous induction     Anesthetic plan, risks, benefits, and alternatives have been provided, discussed and informed consent has been obtained with: patient.    Use of blood products discussed with patient .        CODE STATUS:

## 2024-03-11 NOTE — DISCHARGE PLACEMENT REQUEST
"Jill Vela Jr. (48 y.o. Male)       Date of Birth   1975    Social Security Number       Address   [facility] [address] [place] [facility]    Home Phone   [phone]    MRN   [id number]       Orthodoxy   None    Marital Status                               Admission Date   3/11/24    Admission Type   Elective    Admitting Provider   Kasi Dawson II, MD    Attending Provider   Kasi Dawson II, MD    Department, Room/Bed   [address], 84/[address]       Discharge Date       Discharge Disposition       Discharge Destination                                 Attending Provider: Kasi Dawson II, MD    Allergies: Semaglutide(0.25 Or 0.5mg-dos), Penicillins    Isolation: None   Infection: None   Code Status: Not on file    Ht: 170.2 cm (67.01\")   Wt: 113 kg (249 lb 1.9 oz)    Admission Cmt: None   Principal Problem: Hip joint replacement status [Z96.649]                   Active Insurance as of 3/11/2024       Primary Coverage       Payor Plan Insurance Group Employer/Plan Group    [employer] [id number]       Payor Plan Address Payor Plan Phone Number Payor Plan Fax Number Effective Dates    [address] [phone]  1/1/2018 - None Entered    [facility] [id number]         Subscriber Name Subscriber Birth Date Member ID       JILL VELA JR. 1975 [id number]                     Emergency Contacts        (Rel.) Home Phone Work Phone Mobile Phone    WilliamYuli (Mother) [phone] -- [phone]    FIONA SOUSA (Spouse) [phone] -- [phone]                "

## 2024-03-11 NOTE — THERAPY EVALUATION
Patient Name: Chris Thomas Jr.  : 1975    MRN: [id number]                              Today's Date: 3/11/2024       Admit Date: 3/11/2024    Visit Dx: No diagnosis found.  Patient Active Problem List   Diagnosis    Essential hypertension    Chronic back pain    Obesity (BMI 30-39.9)    Erectile dysfunction    Vitamin D deficiency    Environmental allergies    Primary osteoarthritis of left knee    Dyslipidemia    Elevated TSH    Avascular necrosis of bone of hip, right    Class 2 obesity without serious comorbidity with body mass index (BMI) of 39.0 to 39.9 in adult    Right hip pain    Acquired hypothyroidism    Low testosterone    Prediabetes    Elevated hemoglobin A1c    History of mononucleosis    Hypogonadism in male    Hip joint replacement status     Past Medical History:   Diagnosis Date    At risk for sleep apnea     STOP BANG 5    ED (erectile dysfunction)     Environmental allergies     tested in youth, no prior immunotherapy    Erectile dysfunction     Fracture, clavicle     Broken collar bone as a child    Hip arthrosis 10/2022    This is the reason for my visit    Hypertension     MEDS IN THE PAST WITH WT LOSS.    Hypothyroidism     Low back pain 1998    Back injury.  Sporadic pain from excessive activity    Low back strain     Work injury - [employer]    Low testosterone     Obesity 2000    SARAH (obstructive sleep apnea)     resolved after uvulectomy and T & A in     Osgood-Schlatter's disease 1994    Never officially diagnosed. But was very symptomatic    Primary osteoarthritis of left knee     Vitamin D deficiency      Past Surgical History:   Procedure Laterality Date    ADENOIDECTOMY  2004    TONSILLECTOMY AND ADENOIDECTOMY      Dr. Martinez, done for sleep apnea    UVULECTOMY      for SARAH      General Information       Row Name 24 1413          Physical Therapy Time and Intention    Document Type evaluation  -ZB     Mode of Treatment individual therapy;physical  therapy  -ZB       Row Name 03/11/24 1413          General Information    Patient Profile Reviewed yes  -ZB     Prior Level of Function independent:  -ZB     Existing Precautions/Restrictions fall;hip, anterior  -ZB     Barriers to Rehab none identified  -ZB       Row Name 03/11/24 1413          Living Environment    People in Home spouse  -ZB       Row Name 03/11/24 1413          Home Main Entrance    Number of Stairs, Main Entrance two;three  -ZB     Stair Railings, Main Entrance railing on right side (ascending)  -ZB       Row Name 03/11/24 1413          Stairs Within Home, Primary    Number of Stairs, Within Home, Primary other (see comments)  flight of stairs in home but able to stay on ground level if needed  -ZB       Row Name 03/11/24 1413          Cognition    Orientation Status (Cognition) oriented x 4  -ZB       Row Name 03/11/24 1413          Safety Issues, Functional Mobility    Impairments Affecting Function (Mobility) balance;range of motion (ROM);strength;pain;endurance/activity tolerance  -ZB     Comment, Safety Issues/Impairments (Mobility) gait belt and non skid socks donned  -ZB               User Key  (r) = Recorded By, (t) = Taken By, (c) = Cosigned By      Initials Name Provider Type    ZB Alvin Atkinson PT Physical Therapist                   Mobility       Row Name 03/11/24 1414          Bed Mobility    Bed Mobility supine-sit;sit-supine  -ZB     Supine-Sit La[place] (Bed Mobility) standby assist  -ZB     Sit-Supine [place] (Bed Mobility) standby assist  -ZB     Assistive Device (Bed Mobility) bed rails;head of bed elevated  -ZB     Comment, (Bed Mobility) increased time to complete  -ZB       Row Name 03/11/24 1414          Bed-Chair Transfer    Bed-Chair [place] (Transfers) not tested  -ZB     Comment, (Bed-Chair Transfer) returned to bed to end session  -ZB       Row Name 03/11/24 1414          Sit-Stand Transfer    Sit-Stand [place] (Transfers) contact guard;verbal cues   -ZB     Assistive Device (Sit-Stand Transfers) walker, front-wheeled  -ZB       Row Name 03/11/24 1414          Gait/Stairs (Locomotion)    Jarrell Level (Gait) contact guard;verbal cues  -ZB     Assistive Device (Gait) walker, front-wheeled  -ZB     Patient was able to Ambulate yes  -ZB     Distance in Feet (Gait) 100  -ZB     Deviations/Abnormal Patterns (Gait) right sided deviations;antalgic;festinating/shuffling;stride length decreased  -ZB     Jarrell Level (Stairs) contact guard;verbal cues  -ZB     Handrail Location (Stairs) right side (ascending);left side (descending)  -ZB     Number of Steps (Stairs) 2  -ZB     Ascending Technique (Stairs) step-to-step  -ZB     Descending Technique (Stairs) step-to-step  -ZB     Comment, (Gait/Stairs) steady overall with no LOB and no buckling noted; steady with stair negotiation  -ZB               User Key  (r) = Recorded By, (t) = Taken By, (c) = Cosigned By      Initials Name Provider Type    ZB Alvin Atkinson, JULES Physical Therapist                   Obj/Interventions       Row Name 03/11/24 1415          Range of Motion Comprehensive    Comment, General Range of Motion expected post-op R hip ROM deficits  -ZB       Row Name 03/11/24 1415          Strength Comprehensive (MMT)    Comment, General Manual Muscle Testing (MMT) Assessment expected post-op R LE MMT deficits  -ZB       Row Name 03/11/24 1415          Motor Skills    Motor Skills functional endurance  -ZB     Therapeutic Exercise other (see comments)  GAYLE protocol x10  -ZB       Row Name 03/11/24 1415          Balance    Balance Assessment sitting static balance;sitting dynamic balance;standing static balance;standing dynamic balance  -ZB     Static Sitting Balance supervision  -ZB     Dynamic Sitting Balance supervision  -ZB     Position, Sitting Balance unsupported;sitting edge of bed  -ZB     Static Standing Balance standby assist  -ZB     Dynamic Standing Balance contact guard  -ZB      Position/Device Used, Standing Balance supported;walker, front-wheeled  -ZB     Balance Interventions sitting;standing;sit to stand;supported;static;dynamic  -ZB       Row Name 03/11/24 1415          Sensory Assessment (Somatosensory)    Sensory Assessment (Somatosensory) unable/difficult to assess  -ZB               User Key  (r) = Recorded By, (t) = Taken By, (c) = Cosigned By      Initials Name Provider Type    ZB Alvin Atkinson, PT Physical Therapist                   Goals/Plan       Row Name 03/11/24 1419          Bed Mobility Goal 1 (PT)    Activity/Assistive Device (Bed Mobility Goal 1, PT) bed mobility activities, all  -ZB     [place] Level/Cues Needed (Bed Mobility Goal 1, PT) independent  -ZB     Time Frame (Bed Mobility Goal 1, PT) short term goal (STG);3 days  -ZB       Row Name 03/11/24 1419          Transfer Goal 1 (PT)    Activity/Assistive Device (Transfer Goal 1, PT) sit-to-stand/stand-to-sit;bed-to-chair/chair-to-bed  -ZB     [place] Level/Cues Needed (Transfer Goal 1, PT) modified independence  -ZB     Time Frame (Transfer Goal 1, PT) short term goal (STG);3 days  -ZB       Row Name 03/11/24 1419          Gait Training Goal 1 (PT)    Activity/Assistive Device (Gait Training Goal 1, PT) gait (walking locomotion)  -ZB     [place] Level (Gait Training Goal 1, PT) modified independence  -ZB     Distance (Gait Training Goal 1, PT) 200'  -ZB     Time Frame (Gait Training Goal 1, PT) short term goal (STG);3 days  -ZB       Row Name 03/11/24 1419          Stairs Goal 1 (PT)    Activity/Assistive Device (Stairs Goal 1, PT) stairs, all skills  -ZB     [place] Level/Cues Needed (Stairs Goal 1, PT) modified independence  -ZB     Number of Stairs (Stairs Goal 1, PT) 3  -ZB     Time Frame (Stairs Goal 1, PT) short term goal (STG);3 days  -ZB       Row Name 03/11/24 1419          Therapy Assessment/Plan (PT)    Planned Therapy Interventions (PT) balance training;bed mobility training;gait  training;home exercise program;patient/family education;ROM (range of motion);stair training;transfer training;strengthening  -ZB               User Key  (r) = Recorded By, (t) = Taken By, (c) = Cosigned By      Initials Name Provider Type    Alvin Orona, PT Physical Therapist                   Clinical Impression       Row Name 03/11/24 1416          Pain    Pretreatment Pain Rating 3/10  -ZB     Posttreatment Pain Rating 4/10  -ZB     Pain Location - Side/Orientation Right  -ZB     Pain Location - hip  -ZB     Pain Intervention(s) Cold applied;Repositioned;Ambulation/increased activity;Rest  -ZB       Row Name 03/11/24 1416          Plan of Care Review    Plan of Care Reviewed With patient;spouse  -ZB     Outcome Evaluation Patient is a 47 y/o male evaluated by therapy POD0 s/p R anterior GAYLE. The patient lives with his spouse in a multistory home with 2-3 MICHELE. At baseline he is (I) ADLs and mobility without use of AD. He reports that he is able to stay on the ground floor of his home if needed during initial DC. He will need rolling walker for DC. Today he performed GAYLE protocol, supine > sit sba + increased time, STS cga, ambulated 100' total with cga + rwx, and negotiated 2 steps cga + R handrail. He is overall steady with gait and stair climbing demo'ing no buckling or LOB. Patient educated on exercise prescription, ambulation recommendations, ice usage, falls prevention, precautions and stair negotiation. Pt is OK to DC home with assist as needed and  PT.  -ZB       Row Name 03/11/24 1416          Therapy Assessment/Plan (PT)    Rehab Potential (PT) good, to achieve stated therapy goals  -ZB     Criteria for Skilled Interventions Met (PT) yes  -ZB     Therapy Frequency (PT) daily  -ZB       Row Name 03/11/24 1416          Vital Signs    O2 Delivery Pre Treatment room air  -ZB     O2 Delivery Post Treatment room air  -ZB     Pre Patient Position Supine  -ZB     Intra Patient Position Standing  -ZB      Post Patient Position Supine  -ZB       Row Name 03/11/24 1416          Positioning and Restraints    Pre-Treatment Position in bed  -ZB     Post Treatment Position bed  -ZB     In Bed notified nsg;fowlers;call light within reach;encouraged to call for assist;exit alarm on;with family/caregiver  -ZB               User Key  (r) = Recorded By, (t) = Taken By, (c) = Cosigned By      Initials Name Provider Type    Alvin Orona, JULES Physical Therapist                   Outcome Measures       Row Name 03/11/24 1420 03/11/24 1000       How much help from another person do you currently need...    Turning from your back to your side while in flat bed without using bedrails? 4  -ZB 3  -EE    Moving from lying on back to sitting on the side of a flat bed without bedrails? 4  -ZB 3  -EE    Moving to and from a bed to a chair (including a wheelchair)? 3  -ZB 3  -EE    Standing up from a chair using your arms (e.g., wheelchair, bedside chair)? 3  -ZB 3  -EE    Climbing 3-5 steps with a railing? 3  -ZB 3  -EE    To walk in hospital room? 3  -ZB 3  -EE    AM-PAC 6 Clicks Score (PT) 20  -PAULAB 18  -EE    Highest Level of Mobility Goal 6 --> Walk 10 steps or more  -ZB 6 --> Walk 10 steps or more  -EE      Row Name 03/11/24 1420          Functional Assessment    Outcome Measure Options AM-PAC 6 Clicks Basic Mobility (PT)  -ZB               User Key  (r) = Recorded By, (t) = Taken By, (c) = Cosigned By      Initials Name Provider Type    Caity Brock RN Registered Nurse    Alvin Orona, JULES Physical Therapist                                 Physical Therapy Education       Title: PT OT SLP Therapies (Done)       Topic: Physical Therapy (Done)       Point: Mobility training (Done)       Learning Progress Summary             Patient Acceptance, E,D, GUNJAN,MICHELE by YEIMY at 3/11/2024 1420                         Point: Home exercise program (Done)       Learning Progress Summary             Patient Acceptance, E,D, GUNJAN,MICHELE by YEIMY at  3/11/2024 1420                         Point: Body mechanics (Done)       Learning Progress Summary             Patient Acceptance, E,D, VU,DU by ZKAROLINA at 3/11/2024 1420                         Point: Precautions (Done)       Learning Progress Summary             Patient Acceptance, E,D, VU,DU by YEIMY at 3/11/2024 1420                                         User Key       Initials Effective Dates Name Provider Type Discipline     08/30/23 -  Alvin Atkinson, PT Physical Therapist PT                  PT Recommendation and Plan  Planned Therapy Interventions (PT): balance training, bed mobility training, gait training, home exercise program, patient/family education, ROM (range of motion), stair training, transfer training, strengthening  Plan of Care Reviewed With: patient, spouse  Outcome Evaluation: Patient is a 47 y/o male evaluated by therapy POD0 s/p R anterior GAYLE. The patient lives with his spouse in a multistory home with 2-3 MICHELE. At baseline he is (I) ADLs and mobility without use of AD. He reports that he is able to stay on the ground floor of his home if needed during initial DC. He will need rolling walker for DC. Today he performed GAYLE protocol, supine > sit sba + increased time, STS cga, ambulated 100' total with cga + rwx, and negotiated 2 steps cga + R handrail. He is overall steady with gait and stair climbing demo'ing no buckling or LOB. Patient educated on exercise prescription, ambulation recommendations, ice usage, falls prevention, precautions and stair negotiation. Pt is OK to DC home with assist as needed and  PT.     Time Calculation:         PT Charges       Row Name 03/11/24 1421             Time Calculation    Start Time 1310  -ZB      Stop Time 1333  -ZB      Time Calculation (min) 23 min  -ZB      PT Received On 03/11/24  -ZB      PT - Next Appointment 03/12/24  -ZB      PT Goal Re-Cert Due Date 03/25/24  -ZB         Time Calculation- PT    Total Timed Code Minutes- PT 13 minute(s)  -ZB          Timed Charges    26132 - PT Therapeutic Activity Minutes 13  -ZB         Total Minutes    Timed Charges Total Minutes 13  -ZB       Total Minutes 13  -ZB                User Key  (r) = Recorded By, (t) = Taken By, (c) = Cosigned By      Initials Name Provider Type    Alvin Orona, PT Physical Therapist                  Therapy Charges for Today       Code Description Service Date Service Provider Modifiers Qty    82696759579 HC PT THERAPEUTIC ACT EA 15 MIN 3/11/2024 Alvin Atkinson, PT GP 1    63089083178 HC PT EVAL LOW COMPLEXITY 3 3/11/2024 Alvin Atkinson, PT GP 1            PT G-Codes  Outcome Measure Options: AM-PAC 6 Clicks Basic Mobility (PT)  AM-PAC 6 Clicks Score (PT): 20  PT Discharge Summary  Anticipated Discharge Disposition (PT): home with assist, home with home health    Abdulaziz Atkinson, PT  3/11/2024

## 2024-03-11 NOTE — PLAN OF CARE
Goal Outcome Evaluation:  Plan of Care Reviewed With: patient, spouse           Outcome Evaluation: Patient is a 47 y/o male evaluated by therapy POD0 s/p R anterior GAYLE. The patient lives with his spouse in a multistory home with 2-3 MICHELE. At baseline he is (I) ADLs and mobility without use of AD. He reports that he is able to stay on the ground floor of his home if needed during initial DC. He will need rolling walker for DC. Today he performed GAYLE protocol, supine > sit sba + increased time, STS cga, ambulated 100' total with cga + rwx, and negotiated 2 steps cga + R handrail. He is overall steady with gait and stair climbing demo'ing no buckling or LOB. Patient educated on exercise prescription, ambulation recommendations, ice usage, falls prevention, precautions and stair negotiation. Pt is OK to DC home with assist as needed and  PT.      Anticipated Discharge Disposition (PT): home with assist, home with home health

## 2024-03-11 NOTE — CASE MANAGEMENT/SOCIAL WORK
Discharge Planning Assessment  [facility]     Patient Name: Chris Thomas Jr.  MRN: [id number]  Today's Date: 3/11/2024    Admit Date: 3/11/2024    Plan: Home with [facility]   Discharge Needs Assessment       Row Name 03/11/24 1310       Living Environment    People in Home spouse    Current Living Arrangements home    Potentially Unsafe Housing Conditions none    Provides Primary Care For child(naya)    Family Caregiver if Needed spouse    Quality of Family Relationships supportive;helpful;involved    Able to Return to Prior Arrangements yes                   Discharge Plan       Row Name 03/11/24 1310       Plan    Plan Home with [facility]    Plan Comments CCP met with patient and patient's spouse at bedside. CCP role explained and face sheet verified. Patient lives at home with his spouse and 4 children. Patient plans to return home with [facility] (care plan). Will need walker at d/c. Family to transport. Saloni DEAN                  Continued Care and Services - Admitted Since 3/11/2024       Home Medical Care       Service Provider Request Status Selected Services Address Phone Fax Patient Preferred    [facility] Accepted N/A [address] [facility] [address] [phone] --                     Demographic Summary       Row Name 03/11/24 1310       General Information    Admission Type observation    Arrived From emergency department    Referral Source admission list    Reason for Consult discharge planning    Preferred Language English                   Functional Status       Row Name 03/11/24 1310       Functional Status    Usual Activity Tolerance good    Current Activity Tolerance good       Functional Status, IADL    Medications independent    Meal Preparation independent    Housekeeping independent    Laundry independent    Shopping independent                   Psychosocial    No documentation.                  Abuse/Neglect    No documentation.                  Legal     No documentation.                  Substance Abuse    No documentation.                  Patient Forms    No documentation.                     DIANNE Tillman

## 2024-03-12 ENCOUNTER — READMISSION MANAGEMENT (OUTPATIENT)
Dept: CALL CENTER | Facility: HOSPITAL | Age: 49
End: 2024-03-12
Payer: COMMERCIAL

## 2024-03-12 VITALS
HEIGHT: 67 IN | TEMPERATURE: 98.5 F | RESPIRATION RATE: 16 BRPM | DIASTOLIC BLOOD PRESSURE: 90 MMHG | WEIGHT: 249.12 LBS | OXYGEN SATURATION: 96 % | HEART RATE: 87 BPM | SYSTOLIC BLOOD PRESSURE: 147 MMHG | BODY MASS INDEX: 39.1 KG/M2

## 2024-03-12 LAB
ANION GAP SERPL CALCULATED.3IONS-SCNC: 10.7 MMOL/L (ref 5–15)
BUN SERPL-MCNC: 10 MG/DL (ref 6–20)
BUN/CREAT SERPL: 10.9 (ref 7–25)
CALCIUM SPEC-SCNC: 8.4 MG/DL (ref 8.6–10.5)
CHLORIDE SERPL-SCNC: 100 MMOL/L (ref 98–107)
CO2 SERPL-SCNC: 25.3 MMOL/L (ref 22–29)
CREAT SERPL-MCNC: 0.92 MG/DL (ref 0.76–1.27)
EGFRCR SERPLBLD CKD-EPI 2021: 103.9 ML/MIN/1.73
GLUCOSE SERPL-MCNC: 125 MG/DL (ref 65–99)
HCT VFR BLD AUTO: 37.1 % (ref 37.5–51)
HGB BLD-MCNC: 12.7 G/DL (ref 13–17.7)
POTASSIUM SERPL-SCNC: 3.7 MMOL/L (ref 3.5–5.2)
SODIUM SERPL-SCNC: 136 MMOL/L (ref 136–145)

## 2024-03-12 PROCEDURE — 97530 THERAPEUTIC ACTIVITIES: CPT

## 2024-03-12 PROCEDURE — 80048 BASIC METABOLIC PNL TOTAL CA: CPT | Performed by: ORTHOPAEDIC SURGERY

## 2024-03-12 PROCEDURE — 85014 HEMATOCRIT: CPT | Performed by: ORTHOPAEDIC SURGERY

## 2024-03-12 PROCEDURE — G0378 HOSPITAL OBSERVATION PER HR: HCPCS

## 2024-03-12 PROCEDURE — 85018 HEMOGLOBIN: CPT | Performed by: ORTHOPAEDIC SURGERY

## 2024-03-12 PROCEDURE — 25010000002 CEFAZOLIN PER 500 MG: Performed by: ORTHOPAEDIC SURGERY

## 2024-03-12 RX ORDER — OXYCODONE HYDROCHLORIDE AND ACETAMINOPHEN 5; 325 MG/1; MG/1
1 TABLET ORAL EVERY 4 HOURS PRN
Qty: 50 TABLET | Refills: 0 | Status: SHIPPED | OUTPATIENT
Start: 2024-03-12

## 2024-03-12 RX ORDER — ONDANSETRON 4 MG/1
4 TABLET, FILM COATED ORAL EVERY 6 HOURS PRN
Qty: 30 TABLET | Refills: 0 | Status: SHIPPED | OUTPATIENT
Start: 2024-03-12

## 2024-03-12 RX ORDER — ASPIRIN 81 MG/1
81 TABLET ORAL EVERY 12 HOURS
Qty: 60 TABLET | Refills: 0 | Status: SHIPPED | OUTPATIENT
Start: 2024-03-12 | End: 2024-04-11

## 2024-03-12 RX ADMIN — OXYCODONE AND ACETAMINOPHEN 1 TABLET: 5; 325 TABLET ORAL at 10:52

## 2024-03-12 RX ADMIN — ASPIRIN 81 MG: 81 TABLET, COATED ORAL at 08:05

## 2024-03-12 RX ADMIN — LEVOTHYROXINE SODIUM 50 MCG: 50 TABLET ORAL at 08:05

## 2024-03-12 RX ADMIN — SODIUM CHLORIDE 2000 MG: 900 INJECTION INTRAVENOUS at 00:56

## 2024-03-12 RX ADMIN — FAMOTIDINE 40 MG: 20 TABLET, FILM COATED ORAL at 08:05

## 2024-03-12 RX ADMIN — OXYCODONE AND ACETAMINOPHEN 2 TABLET: 5; 325 TABLET ORAL at 07:20

## 2024-03-12 RX ADMIN — OXYCODONE AND ACETAMINOPHEN 2 TABLET: 5; 325 TABLET ORAL at 00:57

## 2024-03-12 RX ADMIN — MELOXICAM 15 MG: 15 TABLET ORAL at 08:05

## 2024-03-12 NOTE — PLAN OF CARE
Goal Outcome Evaluation:  Plan of Care Reviewed With: patient        Progress: improving  Outcome Evaluation: VSS, alert and oriented x4, RA, pain controlled with prn pain medicine, no issues during the night.

## 2024-03-12 NOTE — OUTREACH NOTE
Prep Survey      Flowsheet Row Responses   [facility] patient discharged from? [facility]   Is LACE score < 7 ? Yes   Eligibility [facility]   Date of Admission 03/11/24   Date of Discharge 03/12/24   Discharge diagnosis [place] ARTHRP ACETBLR/PROX FEM PROSTC AGRFT/ALGRFT (92819) (TOTAL HIP ARTHROPLASTY ANTERIOR WITH HANA TABLE)   Does the patient have one of the following disease processes/diagnoses(primary or secondary)? Total Joint Replacement   Does the patient have Home health ordered? No   Is there a DME ordered? No   Comments regarding appointments [facility]   Prep survey completed? Yes            DALE MORRELL - Registered Nurse

## 2024-03-12 NOTE — CASE MANAGEMENT/SOCIAL WORK
Case Management Discharge Note      Final Note: Home with [facility]. Saloni DEAN         Selected Continued Care - Discharged on 3/12/2024 Admission date: 3/11/2024 - Discharge disposition: Home or Self Care      Destination    No services have been selected for the patient.                Durable Medical Equipment    No services have been selected for the patient.                Dialysis/Infusion    No services have been selected for the patient.                Home Medical Care Coordination complete.      Service Provider Selected Services Address Phone Fax Patient Preferred    [facility] [address] [place] [address] [phone] --              Therapy    No services have been selected for the patient.                Community Resources    No services have been selected for the patient.                Community & DME    No services have been selected for the patient.                         Final Discharge Disposition Code: 01 - home or self-care

## 2024-03-12 NOTE — PLAN OF CARE
Goal Outcome Evaluation:  Plan of Care Reviewed With: patient        Progress: improving  Outcome Evaluation: Pt was seen by PT this AM for tx. Pt stood from chair and from EOB w/ SBA. Pt amb 400' req mod I and use of fww. Pt was steady w/ no overt LOB. Pt declined performance of steps w/ pt stating they were comfortable after completing 3/11. Pt completed R GAYLE program x 15 reps. Pt plans to return home today w/ HH PT and assist of spouse.      Anticipated Discharge Disposition (PT): home with home health, home with assist

## 2024-03-12 NOTE — DISCHARGE INSTRUCTIONS
Total Hip  Discharge Instructions  Dr. HUNTER Begum” Eunice II  [phone]    INCISION CARE  Wash your hands prior to dressing changes  ZEENAT Wound VAC: Postoperatively you had a ZEENAT Wound Vac placed on the incision. This was placed under sterile conditions in the operating room. It remains in place for 7 days postoperatively. After 7 days, the entire dressing must be removed, including all of the sticky adhesive. The dressing and battery pack provide gentle suction to the incision and provide several benefits over a traditional dressing:  It maintains the sterile environment of the OR and reduces the risk of infection  The suction removes unwanted buildup of blood/hematoma under the skin to reduce swelling  The suction also promotes fresh blood supply to the skin and soft tissue to speed up healing  The postoperative scar is reduced in size  Showering is permitted immediately after surgery, but the battery pack must be protected or removed during the shower.   After 7 days the ZEENAT Wound Vac is removed. If there is no drainage, no dressing is required. If there is some scant drainage a dry bandage can be applied and changed daily until seen in the office or until the drainage stops.   No creams or ointments to the incisions until 4 weeks post op.  Do not touch or pick at the incision  Check incision every day and notify surgeon immediately if any of the following signs or symptoms are seen:  Increase in redness  Increase in swelling around the incision and of the entire extremity  Increase in pain  NEW drainage or oozing from the incision  Pulling apart of the edges of the incision  Increase in overall body temperature (greater than 100.4 degrees)  Zip-Line: your incision was closed with a state of the art device.   Is a non-invasive and easy to use wound closure device that replaces sutures, staples and glue for surgical incisions  It minimizes scarring and eliminating “railroad” marks that come with staples or  sutures  It makes removal as atraumatic as peeling off a bandage  Can be removed at home or by a physical therapist or nurse at 14 days postoperatively    ACTIVITIES  Exercises:  Physical therapy will begin immediately while in the hospital. Patients going to a nursing home will get therapy as part of their care at the SNU/SNF facility. Patients going home may also have a therapist come to the house to help them mobilize until they can safely get to an outpatient therapy facility.  Elevate the affected leg most of the day during the first week post operatively. Caution must be taken to avoid pillow placement directly under the heel of the leg, as this can cause pressure ulcers even with a soft pillow. All pillows and blankets should be placed underneath of the thigh and calf so that the heel is free-floating.  Use cold packs for 20-30 minutes approximately 5 times per day.  You should perform the daily stretching and strengthening exercises as taught by the therapist as often as possible. This can be done many times a day.  Full weight bearing is allowed after surgery. It will be sore/painful to put weight on the leg, but this will help the bone to heal and prevent complications such as pneumonia, bed sores and blood clots. Mobilization is vital to the recovery process.  Activities of Daily Living:  No tub baths, hot tubs, or swimming pools for 4 weeks.  May shower and let water run over the incision immediately after surgery. The battery pack of the ZEENAT Wound Vac must be protected or removed while in the shower. After the ZEENAT is removed 7 days after surgery showering is permitted as long as there is no drainage from the wound.     Restrictions  Weight: It is ok to allow full weight bearing after surgery. Weight on the leg actually quickens the recovery process. While it will be sore/painful to put weight on the leg, it is safe to do so. Hip replacement after hip fracture has a much slower recover process. It can  take months to heal fully from a hip fracture and patients even make some slow benefits up to a year afterwards.   Driving: Many patients have questions about when it is safe to return to driving. The answer is that this is extremely variable. It depends on the extent of the surgery, as well as how quickly you heal. Certainly left leg surgeries make returning to driving easier while right leg surgeries require more extensive rehabilitation before driving can be safe. Until you can press down on the brake hard, and are off of all narcotics, driving is not permitted. Your surgeon cannot “clear” you to return to driving, only you can make the decision when you feel it is safe.    Medications  Anticoagulants: After upper extremity surgery most patients do not require an anticoagulant unless you have another injury that will be keeping you from mobilizing. Lower extremity surgery typically does require use of an anticoagulation medicine.   IF YOU HAD LOWER EXTREMITY SURGERY AND ARE NOT DISCHARGED HOME WITH ANY ANTICOAGULANT MEDICINE YOU SHOULD TAKE ASPIRIN 325mg DAILY FOR 30 DAYS POSTOPERATIVELY.  If you are discharged home with an anticoagulant such as Aspirin, Xarelto, Eliquis, Coumadin, or Lovenox, follow these simple instructions:   Notify surgeon immediately if any alvaro bleeding is noted in the urine, stool, emesis, or from the nose or the incision. Blood in the stool will often appear as black rather than red. Blood in urine may appear as pink. Blood in emesis may appear as brown/black like coffee grounds.  You will need to apply pressure for longer periods of time to any cuts or abrasions to stop bleeding  Avoid alcohol while taking anticoagulants  Most anticoagulants are to be taken for 30 days postoperatively. After this time, you may stop using them unless instructed otherwise.   If you were already taking an anticoagulant (commonly Aspirin, Coumadin, or Plavix) you will likely be resuming your normal dose  postoperatively and will be continuing that medication at the discretion of the prescribing physician.  Stool Softeners: You will be at greater risk of constipation after surgery due to being less mobile and the pain medications.  Take stool softeners as needed. Over the counter Colace 100 mg 1-2 capsules twice daily can be taken.  If stools become too loose or too frequent, please decreases the dosage or stop the stool softener.  If constipation occurs despite use of stool softeners, you are to continue the stool softeners and add a laxative (Milk of Magnesia 1 ounce daily as needed)  Drink plenty of fluids, and eat fruits and vegetables during your recovery time. Getting up and mobilizing will help the bowels to recover their regular function, as will weaning off of all narcotics when the pain becomes tolerable.  Pain Medications: Utilized after surgery are narcotics. This is some general information about these medications.  CLASSIFICATION: Pain medications are called Opioids and are narcotics  LEGALITIES: It is illegal to share narcotics with others  DRIVING: it is illegal to drive while under the influence of narcotics. Doing so is a DUI.  POTENTIAL SIDE EFFECTS: nausea, vomiting, itching, dizziness, drowsiness, dry mouth, constipation, and difficulty urinating.  POTENTIAL ADVERSE EFFECTS:  Opioid tolerance can develop with use of pain medications and this simply means that it requires more and more of the medication to control pain. However, this is seen more in patients that use opioids for longer periods of time.  Opioid dependence can develop with use of Opioids. People with opioid dependence will experience withdrawal symptoms upon cessation of the medication.  Opioid addiction can develop with use of Opioids. The incidence of this is very unlikely in patients who take the medications as ordered and stop the medications as instructed.  Opioid overdose can be dangerous, but is unlikely when the medication  is taken as ordered and stopped when ordered. It is important not to mix opioids with alcohol as this can lead to over sedation and respiratory difficulty.  DOSAGE:  After the initial surgical pain begins to resolve, you may begin to decrease the pain medication. By the end of a few weeks, you should be off of pain medications.  Refills will not be given by the office during evening hours, on weekends, or after 6 weeks post-op. You are responsible for weaning off of pain medication. You can increase the time between narcotic pills, taking one every 4 then 6 then 8 hours and so on.  To seek refills on pain medications during the initial 6-week post-operative period, you must call the office to request the refill. The office will then notify you when to  the prescription. DO NOT wait until you are out of the medication to request a refill. Prescriptions will not be filled over the weekend and depending on the schedule, it may take a couple days for the prescription to be available. Someone will have to pick the prescription in person at the office.    FOLLOW-UP VISITS  You will need to follow up in the office with your surgeon in 3 weeks, or as instructed elsewhere in your discharge paperwork. Please call this number [phone] to schedule this appointment. If you are going to an SNF/SNU facility, they will arrange for you to follow up in the office.  If you have any concerns or suspected complications prior to your follow up visit, please call the office. Do not wait until your appointment time if you suspect complications. These will need to be addressed in the office promptly.      Kasi Dawson II, MD  Orthopaedic Surgery  [facility]                 show

## 2024-03-12 NOTE — DISCHARGE SUMMARY
Orthopaedic Discharge Summary  Dr. HARRISON “Jens” Eunice PARHAM  [phone]    NAME: Chris Thomas Jr. PCP: Shelia Cummings APRN   :  MRN: [id number]  [id number] LOS:  ADMIT: 0 days  3/11/2024   AGE/SEX: 48 y.o. male DC:  today             Admitting Diagnosis: Hip joint replacement status [Z96.649]    Surgery Performed: [place] ARTHRP ACETBLR/PROX FEM PROSTC AGRFT/ALGRFT [36889] (TOTAL HIP ARTHROPLASTY ANTERIOR WITH HANA TABLE)    Discharge Medications:         Discharge Medications        New Medications        Instructions Start Date   Aspirin Low Dose 81 MG EC tablet  Generic drug: aspirin   81 mg, Oral, Every 12 Hours      ondansetron 4 MG tablet  Commonly known as: Zofran   4 mg, Oral, Every 6 Hours PRN      oxyCODONE-acetaminophen 5-325 MG per tablet  Commonly known as: PERCOCET   1 tablet, Oral, Every 4 Hours PRN             Continue These Medications        Instructions Start Date   cetirizine 10 MG tablet  Commonly known as: zyrTEC   10 mg, Oral, Daily PRN      ibuprofen 800 MG tablet  Commonly known as: ADVIL,MOTRIN   800 mg, Oral, Every 8 Hours PRN      levothyroxine 50 MCG tablet  Commonly known as: Synthroid   50 mcg, Oral, Daily      methylPREDNISolone 4 MG dose pack  Commonly known as: MEDROL   Use as directed by package instructions      sildenafil 50 MG tablet  Commonly known as: Viagra   50 mg, Oral, Daily PRN      traMADol-acetaminophen 37.5-325 MG per tablet  Commonly known as: ULTRACET   1 tablet, Oral, Every 8 Hours PRN               Vitals:     Vitals:    24 1745 24 2141 24 0147 24 0550   BP: 124/76 143/73 133/75 147/90   BP Location: Left arm Right arm Right arm Right arm   Patient Position: Sitting Lying Lying Lying   Pulse: 82 82 78 87   Resp: 17 16 16 16   Temp: 98.2 °F (36.8 °C) 97.8 °F (36.6 °C) 98.4 °F (36.9 °C) 98.5 °F (36.9 °C)   TempSrc: Oral Oral Oral Oral   SpO2: 97% 97% 96% 96%   Weight:       Height:           Labs:      Admission on 2024, Discharged  on 03/12/2024   Component Date Value Ref Range Status    Hemoglobin 03/11/2024 13.5  13.0 - 17.7 g/dL Final    Hematocrit 03/11/2024 40.6  37.5 - 51.0 % Final    Glucose 03/12/2024 125 (H)  65 - 99 mg/dL Final    BUN 03/12/2024 10  6 - 20 mg/dL Final    Creatinine 03/12/2024 0.92  0.76 - 1.27 mg/dL Final    Sodium 03/12/2024 136  136 - 145 mmol/L Final    Potassium 03/12/2024 3.7  3.5 - 5.2 mmol/L Final    Slight hemolysis detected by analyzer. Result may be falsely elevated.    Chloride 03/12/2024 100  98 - 107 mmol/L Final    CO2 03/12/2024 25.3  22.0 - 29.0 mmol/L Final    Calcium 03/12/2024 8.4 (L)  8.6 - 10.5 mg/dL Final    BUN/Creatinine Ratio 03/12/2024 10.9  7.0 - 25.0 Final    Anion Gap 03/12/2024 10.7  5.0 - 15.0 mmol/L Final    eGFR 03/12/2024 103.9  >60.0 mL/min/1.73 Final    Hemoglobin 03/12/2024 12.7 (L)  13.0 - 17.7 g/dL Final    Hematocrit 03/12/2024 37.1 (L)  37.5 - 51.0 % Final        No results found.    Hospital Course:   48 y.o. male was admitted to [facility] to services of Kasi Dawson II, MD with Hip joint replacement status [Z96.649] on 3/11/2024 and underwent [place] ARTHRP ACETBLR/PROX FEM PROSTC AGRFT/ALGRFT [13582] (TOTAL HIP ARTHROPLASTY ANTERIOR WITH HANA TABLE). Post-operatively the patient transferred to the floor where the patient underwent mobilization therapy. Opioids were titrated to achieve appropriate pain management to allow for participation in mobilization exercises. Vital signs and laboratory values are now within safe parameters for discharge. The dressings and/or incision is intact without signs or symptoms of active infection. Operative extremity neurovascular status remains intact as compared to the preoperative exam. Appropriate education re: incision care, activity levels, medications, and follow up visits was completed and all questions were answered. The patient is now deemed stable for discharge.    HOME: The patient progressed well with physical  "therapy. There were cleared for discharge to home. The patietn was sent home in good condition}.       R \"Jens\" Eunice PARHAM MD  Orthopaedic Surgery  [facility]  [phone]                                               "

## 2024-03-12 NOTE — THERAPY TREATMENT NOTE
Patient Name: Chris Thomas Jr.  : 1975    MRN: [id number]                              Today's Date: 3/12/2024       Admit Date: 3/11/2024    Visit Dx: No diagnosis found.  Patient Active Problem List   Diagnosis    Essential hypertension    Chronic back pain    Obesity (BMI 30-39.9)    Erectile dysfunction    Vitamin D deficiency    Environmental allergies    Primary osteoarthritis of left knee    Dyslipidemia    Elevated TSH    Avascular necrosis of bone of hip, right    Class 2 obesity without serious comorbidity with body mass index (BMI) of 39.0 to 39.9 in adult    Right hip pain    Acquired hypothyroidism    Low testosterone    Prediabetes    Elevated hemoglobin A1c    History of mononucleosis    Hypogonadism in male    Hip joint replacement status     Past Medical History:   Diagnosis Date    At risk for sleep apnea     STOP BANG 5    ED (erectile dysfunction)     Environmental allergies     tested in youth, no prior immunotherapy    Erectile dysfunction     Fracture, clavicle     Broken collar bone as a child    Hip arthrosis 10/2022    This is the reason for my visit    Hypertension     MEDS IN THE PAST WITH WT LOSS.    Hypothyroidism     Low back pain 1998    Back injury.  Sporadic pain from excessive activity    Low back strain     Work injury - [employer]    Low testosterone     Obesity 2000    SARAH (obstructive sleep apnea)     resolved after uvulectomy and T & A in     Osgood-Schlatter's disease 1994    Never officially diagnosed. But was very symptomatic    Primary osteoarthritis of left knee     Vitamin D deficiency      Past Surgical History:   Procedure Laterality Date    ADENOIDECTOMY  2004    TONSILLECTOMY AND ADENOIDECTOMY      Dr. Martinez, done for sleep apnea    UVULECTOMY      for SARAH      General Information       Row Name 24 0758          Physical Therapy Time and Intention    Document Type therapy note (daily note)  -PH     Mode of Treatment physical  therapy  -PH       Row Name 03/12/24 0758          General Information    Existing Precautions/Restrictions fall;hip, anterior  -PH       Row Name 03/12/24 0758          Cognition    Orientation Status (Cognition) oriented x 4  -PH       Row Name 03/12/24 0758          Safety Issues, Functional Mobility    Impairments Affecting Function (Mobility) pain;strength  -PH     Comment, Safety Issues/Impairments (Mobility) gt belt and non skid socks donned  -PH               User Key  (r) = Recorded By, (t) = Taken By, (c) = Cosigned By      Initials Name Provider Type     Patience Estrada PTA Physical Therapist Assistant                   Mobility       Row Name 03/12/24 0759          Bed Mobility    Supine-Sit [place] (Bed Mobility) not tested  -PH     Sit-Supine [place] (Bed Mobility) not tested  -PH       Row Name 03/12/24 0759          Bed-Chair Transfer    Bed-Chair [place] (Transfers) modified independence  -PH     Assistive Device (Bed-Chair Transfers) walker, front-wheeled  -PH       Row Name 03/12/24 0759          Sit-Stand Transfer    Sit-Stand [place] (Transfers) standby assist  -PH     Assistive Device (Sit-Stand Transfers) walker, front-wheeled  -PH       Row Name 03/12/24 0759          Gait/Stairs (Locomotion)    [place] Level (Gait) modified independence  -PH     Assistive Device (Gait) walker, front-wheeled  -PH     Patient was able to Ambulate yes  -PH     Distance in Feet (Gait) 400  -PH     Pattern (Gait) step-through  -PH     Deviations/Abnormal Patterns (Gait) right sided deviations;stride length decreased  -PH     Comment, (Gait/Stairs) no overt LOB or buckling; pt declined additional performance of steps stating he felt comfortable after 3/11 practice  -PH               User Key  (r) = Recorded By, (t) = Taken By, (c) = Cosigned By      Initials Name Provider Type     Patience Estrada PTA Physical Therapist Assistant                   Obj/Interventions        Row Name 03/12/24 0801          Motor Skills    Therapeutic Exercise other (see comments)  R GAYLE program x 15 reps each  -PH       Row Name 03/12/24 0801          Balance    Balance Assessment sitting static balance;standing static balance  -PH     Static Sitting Balance independent  -PH     Static Standing Balance modified independence  -PH     Position/Device Used, Standing Balance walker, front-wheeled  -PH     Comment, Balance no unsteadiness noted  -PH               User Key  (r) = Recorded By, (t) = Taken By, (c) = Cosigned By      Initials Name Provider Type    PH [place], Patience, PTA Physical Therapist Assistant                   Goals/Plan    No documentation.                  Clinical Impression       Row Name 03/12/24 0802          Pain    Pretreatment Pain Rating 3/10  -PH     Pain Location - Side/Orientation Right  -PH     Pain Location incisional  -PH     Pain Location - hip  -PH       Row Name 03/12/24 0802          Plan of Care Review    Plan of Care Reviewed With patient  -PH     Progress improving  -PH     Outcome Evaluation Pt was seen by PT this AM for tx. Pt stood from chair and from EOB w/ SBA. Pt amb 400' req mod I and use of fww. Pt was steady w/ no overt LOB. Pt declined performance of steps w/ pt stating they were comfortable after completing 3/11. Pt completed R GAYLE program x 15 reps. Pt plans to return home today w/ HH PT and assist of spouse.  -PH       Row Name 03/12/24 0802          Vital Signs    O2 Delivery Pre Treatment room air  -PH     O2 Delivery Intra Treatment room air  -PH     O2 Delivery Post Treatment room air  -PH       Row Name 03/12/24 0802          Positioning and Restraints    Pre-Treatment Position sitting in chair/recliner  -PH     Post Treatment Position chair  -PH     In Chair reclined;call light within reach;encouraged to call for assist;notified nsg  -PH               User Key  (r) = Recorded By, (t) = Taken By, (c) = Cosigned By      Initials Name Provider  Type     Patience Estrada PTA Physical Therapist Assistant                   Outcome Measures       Row Name 03/12/24 0805 03/11/24 2200       How much help from another person do you currently need...    Turning from your back to your side while in flat bed without using bedrails? 4  -PH 4  -MB    Moving from lying on back to sitting on the side of a flat bed without bedrails? 4  -PH 4  -MB    Moving to and from a bed to a chair (including a wheelchair)? 4  -PH 3  -MB    Standing up from a chair using your arms (e.g., wheelchair, bedside chair)? 4  -PH 3  -MB    Climbing 3-5 steps with a railing? 3  -PH 3  -MB    To walk in hospital room? 3  -PH 3  -MB    AM-PAC 6 Clicks Score (PT) 22  -PH 20  -MB    Highest Level of Mobility Goal 7 --> Walk 25 feet or more  -PH 6 --> Walk 10 steps or more  -MB      Row Name 03/12/24 0805          Functional Assessment    Outcome Measure Options AM-PAC 6 Clicks Basic Mobility (PT)  -PH               User Key  (r) = Recorded By, (t) = Taken By, (c) = Cosigned By      Initials Name Provider Type     Patience Estrada PTA Physical Therapist Assistant    Susy Harmon RN Registered Nurse                                 Physical Therapy Education       Title: PT OT SLP Therapies (Done)       Topic: Physical Therapy (Done)       Point: Mobility training (Done)       Learning Progress Summary             Patient Acceptance, E,TB,D, VU,DU by  at 3/12/2024 0805    Acceptance, E,D, VU,DU by ZB at 3/11/2024 1420                         Point: Home exercise program (Done)       Learning Progress Summary             Patient Acceptance, E,TB,D, VU,DU by  at 3/12/2024 0805    Acceptance, E,D, VU,DU by ZB at 3/11/2024 1420                         Point: Body mechanics (Done)       Learning Progress Summary             Patient Acceptance, E,TB,D, VU,DU by  at 3/12/2024 0805    Acceptance, E,D, VU,DU by ZB at 3/11/2024 1420                         Point: Precautions  (Done)       Learning Progress Summary             Patient Acceptance, E,TB,D, VU,DU by PH at 3/12/2024 0805    Acceptance, E,D, VU,DU by ZB at 3/11/2024 1420                                         User Key       Initials Effective Dates Name Provider Type Discipline     06/16/21 -  Patience Estrada PTA Physical Therapist Assistant PT    ZB 08/30/23 -  Alvin Atkinson PT Physical Therapist PT                  PT Recommendation and Plan     Plan of Care Reviewed With: patient  Progress: improving  Outcome Evaluation: Pt was seen by PT this AM for tx. Pt stood from chair and from EOB w/ SBA. Pt amb 400' req mod I and use of fww. Pt was steady w/ no overt LOB. Pt declined performance of steps w/ pt stating they were comfortable after completing 3/11. Pt completed R GAYLE program x 15 reps. Pt plans to return home today w/ HH PT and assist of spouse.     Time Calculation:         PT Charges       Row Name 03/12/24 0805             Time Calculation    Start Time 0739  -PH      Stop Time 0753  -PH      Time Calculation (min) 14 min  -PH      PT Received On 03/12/24  -PH      PT - Next Appointment 03/13/24  -PH         Timed Charges    40567 - PT Therapeutic Exercise Minutes 6  -PH      37597 - PT Therapeutic Activity Minutes 8  -PH         Total Minutes    Timed Charges Total Minutes 14  -PH       Total Minutes 14  -PH                User Key  (r) = Recorded By, (t) = Taken By, (c) = Cosigned By      Initials Name Provider Type     Patience Estrada PTA Physical Therapist Assistant                  Therapy Charges for Today       Code Description Service Date Service Provider Modifiers Qty    [id number]  PT THERAPEUTIC ACT EA 15 MIN 3/12/2024 Patience Estrada PTA GP 1            PT G-Codes  Outcome Measure Options: AM-PAC 6 Clicks Basic Mobility (PT)  AM-PAC 6 Clicks Score (PT): 22  PT Discharge Summary  Anticipated Discharge Disposition (PT): home with home health, home with assist    Patience  Sean, PTA  3/12/2024

## 2024-03-12 NOTE — PLAN OF CARE
Goal Outcome Evaluation:  Plan of Care Reviewed With: patient   All goals met, pt safe for d/c     Progress: improving

## 2024-03-13 ENCOUNTER — TRANSITIONAL CARE MANAGEMENT TELEPHONE ENCOUNTER (OUTPATIENT)
Dept: CALL CENTER | Facility: HOSPITAL | Age: 49
End: 2024-03-13
Payer: COMMERCIAL

## 2024-03-13 NOTE — OUTREACH NOTE
Call Center TCM Note      Flowsheet Row Responses   [facility] [place] patient discharged from? [place]   Does the patient have one of the following disease processes/diagnoses(primary or secondary)? Total Joint Replacement   Joint surgery performed? Hip   TCM attempt successful? No   Unsuccessful attempts Attempt 1   Call Status Left message  [No verbal release]            Crystal Antoine LPN    3/13/2024, 08:06 EDT

## 2024-03-13 NOTE — OUTREACH NOTE
Call Center TCM Note      Flowsheet Row Responses   [facility] patient discharged from? [place]   Does the patient have one of the following disease processes/diagnoses(primary or secondary)? Total Joint Replacement   Joint surgery performed? Hip   TCM attempt successful? No   Unsuccessful attempts Attempt 2            Crystal Antoine LPN    3/13/2024, 12:59 EDT

## 2024-03-14 ENCOUNTER — TRANSITIONAL CARE MANAGEMENT TELEPHONE ENCOUNTER (OUTPATIENT)
Dept: CALL CENTER | Facility: HOSPITAL | Age: 49
End: 2024-03-14
Payer: COMMERCIAL

## 2024-03-14 NOTE — OUTREACH NOTE
Call Center TCM Note      Flowsheet Row Responses   [facility] facility patient discharged from? [place]   Does the patient have one of the following disease processes/diagnoses(primary or secondary)? Total Joint Replacement   Joint surgery performed? Hip   TCM attempt successful? No   Unsuccessful attempts Attempt 3            Pretty Ash RN    3/14/2024, 16:53 EDT

## 2024-03-22 DIAGNOSIS — R79.89 LOW TESTOSTERONE: ICD-10-CM

## 2024-03-22 DIAGNOSIS — Z31.81 REPRODUCTIVE MGMT, INFERTILITY DUE TO MALE FACTOR: Primary | ICD-10-CM

## 2024-03-22 DIAGNOSIS — N97.8 REPRODUCTIVE MGMT, INFERTILITY DUE TO MALE FACTOR: Primary | ICD-10-CM

## 2024-04-03 ENCOUNTER — OFFICE VISIT (OUTPATIENT)
Dept: ENDOCRINOLOGY | Facility: CLINIC | Age: 49
End: 2024-04-03
Payer: COMMERCIAL

## 2024-04-03 VITALS
OXYGEN SATURATION: 98 % | SYSTOLIC BLOOD PRESSURE: 134 MMHG | HEIGHT: 67 IN | HEART RATE: 88 BPM | WEIGHT: 244 LBS | DIASTOLIC BLOOD PRESSURE: 86 MMHG | BODY MASS INDEX: 38.3 KG/M2

## 2024-04-03 DIAGNOSIS — N52.9 ERECTILE DYSFUNCTION, UNSPECIFIED ERECTILE DYSFUNCTION TYPE: ICD-10-CM

## 2024-04-03 DIAGNOSIS — R79.89 LOW TESTOSTERONE IN MALE: Primary | ICD-10-CM

## 2024-04-03 PROCEDURE — 99204 OFFICE O/P NEW MOD 45 MIN: CPT | Performed by: INTERNAL MEDICINE

## 2024-04-03 RX ORDER — TADALAFIL 5 MG/1
5 TABLET ORAL DAILY PRN
Qty: 30 TABLET | Refills: 5 | Status: SHIPPED | OUTPATIENT
Start: 2024-04-03

## 2024-04-03 NOTE — PROGRESS NOTES
-----------------------------------------------------------------  ENDOCRINE CLINIC NOTE  -----------------------------------------------------------------        PATIENT NAME: Chris Thomas   PATIENT : 1975 AGE: 48 y.o.  MRN NUMBER: [id number]  PRIMARY CARE: Shelia Cummings APRN    ==========================================================================    CHIEF COMPLAINT: Hypogonadism  DATE OF SERVICE: 24    ==========================================================================    HPI / SUBJECTIVE    48 y.o. male is seen in the clinic today for hypogonadism.  Patient reports that he had been suffering from erectile dysfunction for which patient was referred by primary care was found to have evidence of low testosterone and referred to endocrinology clinic for further evaluation.  Patient further adds that he was diagnosed with hypogonadism around 8 to 10 years ago and was treated with AndroGel for around 6 months time by primary care.  But patient is off therapy since then.  Patient is currently using sildenafil therapy which has helped but patient is trying to figure out the cause for erectile dysfunction.    Fatigue: -ve  Decreased libido: Normal  Sexual dysfunction / ED: +ve  Morning erections: +ve  Depressed mood: -ve  Decreased muscle mass: negative  Decreased body hair: -ve  Gynecomastia: -ve  Blurry vision/double vision/headaches:  none  Changes in voice: none    - Duration: One year time  - Interventions: 8-10 yrs ago was treated with AndroGel    - Biological children: 2  - Trauma/surgery to groin area: None  - Exposure to radiation or chemotherapy: None  - Hx of obstructive sleep apnea or sleep study: Yes, Rx with therapy   - Current or previous use of opiates: None  - BMI: 38.20  - Hx of prostate issues: None  - Blood disorders: None    - Hx of MI/CVA/blood clots: None      ==========================================================================                                                 PAST MEDICAL HISTORY    Past Medical History:   Diagnosis Date    At risk for sleep apnea     STOP BANG 5    ED (erectile dysfunction)     Environmental allergies     tested in youth, no prior immunotherapy    Erectile dysfunction     Fracture, clavicle 1984    Broken collar bone as a child    Hip arthrosis 10/2022    This is the reason for my visit    Hypertension     MEDS IN THE PAST WITH WT LOSS.    Hypothyroidism     Low back pain 1/1/1998    Back injury.  Sporadic pain from excessive activity    Low back strain 1995    Work injury - [employer]    Low testosterone     Obesity 1/1/2000    SARAH (obstructive sleep apnea)     resolved after uvulectomy and T & A in 2005    Osgood-Schlatter's disease 1994    Never officially diagnosed. But was very symptomatic    Primary osteoarthritis of left knee     Testosterone deficiency     Vitamin D deficiency        ==========================================================================    PAST SURGICAL HISTORY    Past Surgical History:   Procedure Laterality Date    ADENOIDECTOMY  2004    TONSILLECTOMY AND ADENOIDECTOMY  2005    Dr. Martinez, done for sleep apnea    TOTAL HIP ARTHROPLASTY Right 03/11/2024    Procedure: TOTAL HIP ARTHROPLASTY ANTERIOR WITH HANA TABLE;  Surgeon: Kasi Dawson II, MD;  Location: [facility] OR [facility];  Service: Orthopedics;  Laterality: Right;    UVULECTOMY  2005    for SARAH       ==========================================================================    FAMILY HISTORY    Family History   Problem Relation Age of Onset    Hypertension Mother     Hyperlipidemia Mother     Colon cancer Father         dx'd at 62    Hypertension Father     Cancer Father         Colon cancer    Hyperlipidemia Father     Hypertension Sister     No Known Problems Son     Alcohol abuse Maternal Uncle     Diabetes Maternal Grandmother     Arthritis Maternal Grandmother     Stroke Maternal Grandfather     COPD Paternal Grandmother     Stroke Paternal  Grandfather     Heart disease Paternal Grandfather     Lung disease Neg Hx     Malig Hyperthermia Neg Hx        ==========================================================================    SOCIAL HISTORY    Social History     Socioeconomic History    Marital status:    Tobacco Use    Smoking status: Never    Smokeless tobacco: Never    Tobacco comments:     Never smoked or used tobacco of any kind   Vaping Use    Vaping status: Never Used   Substance and Sexual Activity    Alcohol use: Not Currently     Comment: rare occassion    Drug use: No    Sexual activity: Yes     Partners: Female     Birth control/protection: Coitus interruptus, None     Comment: Galina Castañeda       ==========================================================================    MEDICATIONS      Current Outpatient Medications:     aspirin 81 MG EC tablet, Take 1 tablet by mouth Every 12 (Twelve) Hours for 30 days., Disp: 60 tablet, Rfl: 0    cetirizine (zyrTEC) 10 MG tablet, Take 1 tablet by mouth Daily As Needed for Allergies or Rhinitis., Disp: , Rfl:     ibuprofen (ADVIL,MOTRIN) 800 MG tablet, TAKE 1 TABLET BY MOUTH EVERY 8 (EIGHT) HOURS AS NEEDED FOR MILD PAIN. (Patient taking differently: Take 1 tablet by mouth Every 8 (Eight) Hours As Needed for Mild Pain. WILL HOLD PER MD ORDERS), Disp: 180 tablet, Rfl: 1    levothyroxine (Synthroid) 50 MCG tablet, Take 1 tablet by mouth Daily., Disp: 30 tablet, Rfl: 1    ==========================================================================    ALLERGIES    Allergies   Allergen Reactions    Semaglutide(0.25 Or 0.5mg-Dos) Nausea And Vomiting    Penicillins Hives and Rash     CHILDHOOD. DOES FINE WITH CEPHALSPORINS       ==========================================================================    OBJECTIVE    Vitals:    04/03/24 1336   BP: 134/86   Pulse: 88   SpO2: 98%     Body mass index is 38.2 kg/m².     General: Alert, cooperative, no acute distress  Thyroid:  no  enlargement/tenderness/palpable nodules  Lungs: Clear to auscultation bilaterally, respirations unlabored  Heart: Regular rate and rhythm, S1 and S2 normal, no murmur, rub or gallop  Abdomen: Soft, NT, ND and Bowel sounds Positive  Extremities:  Extremities normal, atraumatic, no cyanosis or edema    ==========================================================================    LAB EVALUATION    Lab Results   Component Value Date    GLUCOSE 125 (H) 03/12/2024    BUN 10 03/12/2024    CREATININE 0.92 03/12/2024    EGFRIFNONA 80 01/14/2020    EGFRIFAFRI 97 01/14/2020    BCR 10.9 03/12/2024    K 3.7 03/12/2024    CO2 25.3 03/12/2024    CALCIUM 8.4 (L) 03/12/2024    PROTENTOTREF 6.9 12/14/2023    ALBUMIN 4.1 03/04/2024    LABIL2 1.8 12/14/2023    AST 14 03/04/2024    ALT 20 03/04/2024    TRIG 99 01/14/2020    HDL 36 (L) 01/14/2020    LDL 90 01/14/2020     Lab Results   Component Value Date    HGBA1C 5.50 03/04/2024    HGBA1C 5.8 (H) 12/14/2023    HGBA1C 5.50 01/14/2020     Lab Results   Component Value Date    MICROALBUR <3.0 12/19/2018    CREATININE 0.92 03/12/2024     Lab Results   Component Value Date    TSH 2.710 03/04/2024    FREET4 0.99 01/30/2020    THYROIDAB 6 01/30/2020       ==========================================================================    ASSESSMENT AND PLAN    # Low testosterone in male  # Erectile dysfunction  - Patient was initially diagnosed with hypogonadism or low testosterone level at around age 38 and was treated with AndroGel for 6 months time and then patient has been off therapy since  - Patient is recently complaining of erectile dysfunction which he describes as inability to maintain erection  - Patient continues to have morning erections  - Reviewed blood work that was done x 2 but both the levels were drawn later in the day and ideally testosterone needs to be checked no later than 8:30 AM in a fasting state after at least 6 hours of good sleep  - Patient is also trying to achieve  "pregnancy and currently have scheduled visit with urology and fertility care  - In order to evaluate true evidence of low testosterone we will start blood work again with serum testosterone to be checked x 2 at least 2 to 3 weeks apart again in a fasting state no later than 8:30 AM after 6 hours of good sleep to which he verbalized understanding  - Discontinue sildenafil and patient to start taking Cialis 5 mg 1 pill by mouth daily for rectal dysfunction  - Patient to follow-up in the clinic in 4 to 6 weeks time  - Counseled patient in detail about testosterone replacement therapy and side effects associated with that includes infertility (which would be counterproductive for the patient given he is trying to get pregnant), cardiovascular risk factor including uncontrolled hypertension, risk for sudden MI and coronary artery disease along with risk for stroke, blood clot and prostate disorders to which he verbalized understanding    Thank you for courtesy of consultation.    Return to clinic: 4-6 weeks    Entire assessment and plan was discussed and counseled the patient in detail to which patient verbalized understanding and agreed with care.  Answered all queries and concerns.    This note was created using voice recognition software and is inherently subject to errors including those of syntax and \"sound-alike\" substitutions which may escape proofreading.  In such instances, original meaning may be extrapolated by contextual derivation.    Note: Portions of this note may have been copied from previous notes but documentation have been reviewed and edited as necessary to support clinical decision making for today's visit.    ==========================================================================    INFORMATION PROVIDED TO PATIENT    Patient Instructions   Please,    - Get blood work done for low testosterone.  - Blood work needs to be collected no later than 8:30 AM at 2 different occasion in a fasting state " which starts at midnight and after 6 hours of sleep.  - Depending on your blood work results we will plan for further evaluation.  - Additionally he can start taking Cialis 5 mg 1 pill by mouth daily.  Do not use any other Cialis or sildenafil use along with this therapy.    Follow-up in my clinic in 4-6 weeks time.    Thank you for your visit today.    If you have any questions or concerns please feel free to reach out of the office.       ==========================================================================  Terrence Pineda MD  Department of Endocrine, Diabetes and Metabolism  [place], [place]  ==========================================================================

## 2024-04-03 NOTE — PATIENT INSTRUCTIONS
Please,    - Get blood work done for low testosterone.  - Blood work needs to be collected no later than 8:30 AM at 2 different occasion in a fasting state which starts at midnight and after 6 hours of sleep.  - Depending on your blood work results we will plan for further evaluation.  - Additionally he can start taking Cialis 5 mg 1 pill by mouth daily.  Do not use any other Cialis or sildenafil use along with this therapy.    Follow-up in my clinic in 4-6 weeks time.    Thank you for your visit today.    If you have any questions or concerns please feel free to reach out of the office.   
Detail Level: Zone
Products Recommended: SPF 30 or higher
General Sunscreen Counseling: I recommended a broad spectrum sunscreen with a SPF of 30 or higher.  I explained that SPF 30 sunscreens block approximately 97 percent of the sun's harmful rays.  Sunscreens should be applied at least 15 minutes prior to expected sun exposure and then every 2 hours after that as long as sun exposure continues. If swimming or exercising sunscreen should be reapplied every 45 minutes to an hour after getting wet or sweating.  One ounce, or the equivalent of a shot glass full of sunscreen, is adequate to protect the skin not covered by a bathing suit. I also recommended a lip balm with a sunscreen as well. Sun protective clothing can be used in lieu of sunscreen but must be worn the entire time you are exposed to the sun's rays.

## 2024-04-05 ENCOUNTER — CLINICAL SUPPORT (OUTPATIENT)
Dept: FAMILY MEDICINE CLINIC | Facility: CLINIC | Age: 49
End: 2024-04-05
Payer: COMMERCIAL

## 2024-04-05 DIAGNOSIS — R79.89 LOW TESTOSTERONE IN MALE: ICD-10-CM

## 2024-04-05 PROCEDURE — 84402 ASSAY OF FREE TESTOSTERONE: CPT | Performed by: INTERNAL MEDICINE

## 2024-04-05 PROCEDURE — 36415 COLL VENOUS BLD VENIPUNCTURE: CPT | Performed by: REGISTERED NURSE

## 2024-04-05 PROCEDURE — 83001 ASSAY OF GONADOTROPIN (FSH): CPT | Performed by: INTERNAL MEDICINE

## 2024-04-05 PROCEDURE — 83002 ASSAY OF GONADOTROPIN (LH): CPT | Performed by: INTERNAL MEDICINE

## 2024-04-05 PROCEDURE — 84146 ASSAY OF PROLACTIN: CPT | Performed by: INTERNAL MEDICINE

## 2024-04-05 PROCEDURE — 84403 ASSAY OF TOTAL TESTOSTERONE: CPT | Performed by: INTERNAL MEDICINE

## 2024-04-05 NOTE — PROGRESS NOTES
Venipuncture Blood Specimen Collection  Venipuncture performed in rt arm via venipuncture by Shima Johnson with good hemostasis. Patient tolerated the procedure well without complications.   04/05/24   Shima Johnson

## 2024-04-06 DIAGNOSIS — E03.9 ACQUIRED HYPOTHYROIDISM: ICD-10-CM

## 2024-04-06 LAB
FSH SERPL-ACNC: 4.98 MIU/ML
LH SERPL-ACNC: 2.85 MIU/ML
PROLACTIN SERPL-MCNC: 9.79 NG/ML (ref 4.04–15.2)

## 2024-04-08 RX ORDER — LEVOTHYROXINE SODIUM 0.05 MG/1
50 TABLET ORAL DAILY
Qty: 90 TABLET | Refills: 3 | Status: SHIPPED | OUTPATIENT
Start: 2024-04-08

## 2024-04-12 LAB
TESTOST FREE SERPL-MCNC: 7.9 PG/ML (ref 6.8–21.5)
TESTOST SERPL-MCNC: 305 NG/DL (ref 264–916)

## 2024-05-01 ENCOUNTER — OFFICE VISIT (OUTPATIENT)
Dept: ENDOCRINOLOGY | Facility: CLINIC | Age: 49
End: 2024-05-01
Payer: COMMERCIAL

## 2024-05-01 VITALS
SYSTOLIC BLOOD PRESSURE: 128 MMHG | BODY MASS INDEX: 39.08 KG/M2 | WEIGHT: 249 LBS | HEART RATE: 82 BPM | OXYGEN SATURATION: 98 % | HEIGHT: 67 IN | DIASTOLIC BLOOD PRESSURE: 80 MMHG

## 2024-05-01 DIAGNOSIS — N52.9 ERECTILE DYSFUNCTION, UNSPECIFIED ERECTILE DYSFUNCTION TYPE: Primary | ICD-10-CM

## 2024-05-01 NOTE — PATIENT INSTRUCTIONS
Please,    - Blood work on 4/5/2024 showed normal testosterone level.  - After completing your sildenafil therapy you can start using Cialis 5 mg 1 pill by mouth daily for erectile dysfunction.  - You can continue to follow-up with your primary care and can be seen in the endocrinology clinic as needed.    Thank you for your visit today.    If you have any questions or concerns please feel free to reach out of the office.

## 2024-07-19 ENCOUNTER — OFFICE VISIT (OUTPATIENT)
Dept: FAMILY MEDICINE CLINIC | Facility: CLINIC | Age: 49
End: 2024-07-19
Payer: COMMERCIAL

## 2024-07-19 VITALS
HEIGHT: 67 IN | WEIGHT: 255 LBS | OXYGEN SATURATION: 96 % | BODY MASS INDEX: 40.02 KG/M2 | TEMPERATURE: 98.6 F | SYSTOLIC BLOOD PRESSURE: 139 MMHG | DIASTOLIC BLOOD PRESSURE: 96 MMHG | HEART RATE: 91 BPM

## 2024-07-19 DIAGNOSIS — R45.4 IRRITABILITY: ICD-10-CM

## 2024-07-19 DIAGNOSIS — F33.1 MODERATE EPISODE OF RECURRENT MAJOR DEPRESSIVE DISORDER: Primary | ICD-10-CM

## 2024-07-19 DIAGNOSIS — R45.4 ANGER: ICD-10-CM

## 2024-07-19 PROCEDURE — 99213 OFFICE O/P EST LOW 20 MIN: CPT | Performed by: NURSE PRACTITIONER

## 2024-07-22 PROBLEM — R45.4 ANGER: Status: ACTIVE | Noted: 2024-07-22

## 2024-07-22 PROBLEM — F33.1 MODERATE EPISODE OF RECURRENT MAJOR DEPRESSIVE DISORDER: Status: ACTIVE | Noted: 2024-07-22

## 2024-07-22 PROBLEM — R45.4 IRRITABILITY: Status: ACTIVE | Noted: 2024-07-22

## 2024-07-22 NOTE — PROGRESS NOTES
"Chief Complaint  Depression        Chris Thmoas  presents to [facility]        Subjective      48-year-old male patient presents today with complaints of depression.    He has been with some personal loss in the past regarding trying to conceive a child.  Patient reports he is very emotional and gets angry often.  His frustrations are very easy and he is often irritable.  He has lost interest in things.  He does have a supportive wife.  His sleep is poor and he cannot get to sleep.  When he is sleeping he wakes up but rarely.  He does have early morning wakings and sometimes cannot get back to sleep.  He works from home and feels isolated.  He is not with any anxiety, SI, HI, want to self-harm.  He does report having a hip replacement recently and had been in a lot of pain for quite some time that has gotten better but has contributed to the depression.                                  Objective         Vital Signs:     /96 (BP Location: Right arm, Patient Position: Sitting, Cuff Size: Adult)   Pulse 91   Temp 98.6 °F (37 °C) (Infrared)   Ht 170.2 cm (67.01\")   Wt 116 kg (255 lb)   SpO2 96%   BMI 39.93 kg/m²       Physical Exam  Vitals reviewed.   Constitutional:       Appearance: He is well-developed.      Comments:      HENT:      Head: Normocephalic and atraumatic.   Eyes:      Conjunctiva/sclera: Conjunctivae normal.   Cardiovascular:      Rate and Rhythm: Normal rate and regular rhythm.      Pulses: Normal pulses.      Heart sounds: Normal heart sounds.   Pulmonary:      Effort: Pulmonary effort is normal.   Musculoskeletal:         General: Normal range of motion.      Cervical back: Normal range of motion.   Skin:     General: Skin is warm and dry.      Findings: No rash.   Neurological:      Mental Status: He is alert and oriented to person, place, and time.   Psychiatric:         Attention and Perception: Attention and perception normal.         Mood and " Affect: Mood is depressed. Mood is not anxious. Affect is flat and tearful.         Speech: Speech normal.         Behavior: Behavior normal. Behavior is cooperative.         Thought Content: Thought content normal. Thought content is not paranoid or delusional. Thought content does not include homicidal or suicidal ideation. Thought content does not include homicidal or suicidal plan.                Depression  His past medical history is significant for depression.         Patient Active Problem List   Diagnosis    Essential hypertension    Chronic back pain    Obesity (BMI 30-39.9)    Erectile dysfunction    Vitamin D deficiency    Environmental allergies    Primary osteoarthritis of left knee    Dyslipidemia    Elevated TSH    Avascular necrosis of bone of hip, right    Class 2 obesity without serious comorbidity with body mass index (BMI) of 39.0 to 39.9 in adult    Right hip pain    Acquired hypothyroidism    Low testosterone    Prediabetes    Elevated hemoglobin A1c    History of mononucleosis    Hypogonadism in male    Hip joint replacement status    Reproductive mgmt, 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    Hypothyroidism     Low back pain 1/1/1998    Back injury.  Sporadic pain from excessive activity    Low back strain 1995    Work injury - [employer]    Low testosterone     Obesity 1/1/2000    SARAH (obstructive sleep apnea)     resolved after uvulectomy and T & A in 2005    Osgood-Schlatter's disease 1994    Never officially diagnosed. But was very  symptomatic    Primary osteoarthritis of left knee     Testosterone deficiency     Vitamin D deficiency           Family History   Problem Relation Age of Onset    Hypertension Mother     Hyperlipidemia Mother     Colon cancer Father         dx'd at 62    Hypertension Father     Cancer Father         Colon cancer    Hyperlipidemia Father     Hypertension Sister     No Known Problems Son     Alcohol abuse Maternal Uncle     Diabetes Maternal Grandmother     Arthritis Maternal Grandmother     Stroke Maternal Grandfather     COPD Paternal Grandmother     Stroke Paternal Grandfather     Heart disease Paternal Grandfather     Lung disease Neg Hx     Malig Hyperthermia Neg Hx           Past Surgical History:   Procedure Laterality Date    ADENOIDECTOMY  2004    TONSILLECTOMY AND ADENOIDECTOMY  2005    Dr. Martinez, done for sleep apnea    TOTAL HIP ARTHROPLASTY Right 03/11/2024    Procedure: TOTAL HIP ARTHROPLASTY ANTERIOR WITH HANA TABLE;  Surgeon: Kasi Dawson II, MD;  [facility] [facility] [place];  Service: Orthopedics;  Laterality: Right;    UVULECTOMY  2005    for SARAH          Social History     Socioeconomic History    Marital status:    Tobacco Use    Smoking status: Never     Passive exposure: Never    Smokeless tobacco: Never    Tobacco comments:     Never smoked or used tobacco of any kind   Vaping Use    Vaping status: Never Used   Substance and Sexual Activity    Alcohol use: Not Currently     Comment: rare occassion    Drug use: No    Sexual activity: Yes     Partners: Female     Birth control/protection: None     Comment: Galina Castañeda                    Result Review :                                                 Assessment and Plan      Diagnoses and all orders for this visit:    1. Moderate episode of recurrent major depressive disorder (Primary)    2. Irritability    3. Anger          Will obtain GeneSight testing for patient but go ahead and place him on sertraline 50 mg daily.  I would  like him to increase this to 100 mg on week 2.  He will follow-up in 4 weeks.  Will notify when GeneSight testing comes back.  This documentation did not occur on day of office visit as there was down Internet.                        Follow Up       No follow-ups on file.      Patient was given instructions and counseling regarding his condition or for health maintenance advice. Please see specific information pulled into the AVS if appropriate.     Shelia Cummings, APRN7/22/202413:05 EDT  This note has been electronically signed

## 2024-07-30 ENCOUNTER — TELEPHONE (OUTPATIENT)
Dept: FAMILY MEDICINE CLINIC | Facility: CLINIC | Age: 49
End: 2024-07-30
Payer: COMMERCIAL

## 2024-07-31 NOTE — TELEPHONE ENCOUNTER
Spoke with pt, he is aware and verbally understands, states he is willing to make a switch and try the pristiq first and see how much his insurance will cover on that.

## 2024-08-01 DIAGNOSIS — E03.9 ACQUIRED HYPOTHYROIDISM: ICD-10-CM

## 2024-08-01 DIAGNOSIS — R45.4 IRRITABILITY: ICD-10-CM

## 2024-08-01 DIAGNOSIS — F33.1 MODERATE EPISODE OF RECURRENT MAJOR DEPRESSIVE DISORDER: Primary | ICD-10-CM

## 2024-08-01 DIAGNOSIS — R45.4 ANGER: ICD-10-CM

## 2024-08-01 RX ORDER — DESVENLAFAXINE SUCCINATE 50 MG/1
50 TABLET, EXTENDED RELEASE ORAL DAILY
Qty: 90 TABLET | Refills: 0 | Status: SHIPPED | OUTPATIENT
Start: 2024-08-01

## 2024-09-16 ENCOUNTER — OFFICE VISIT (OUTPATIENT)
Dept: FAMILY MEDICINE CLINIC | Facility: CLINIC | Age: 49
End: 2024-09-16
Payer: COMMERCIAL

## 2024-09-16 VITALS
SYSTOLIC BLOOD PRESSURE: 148 MMHG | DIASTOLIC BLOOD PRESSURE: 88 MMHG | WEIGHT: 258 LBS | BODY MASS INDEX: 40.49 KG/M2 | TEMPERATURE: 97.7 F | OXYGEN SATURATION: 98 % | HEART RATE: 83 BPM | HEIGHT: 67 IN

## 2024-09-16 DIAGNOSIS — Z12.5 SCREENING PSA (PROSTATE SPECIFIC ANTIGEN): ICD-10-CM

## 2024-09-16 DIAGNOSIS — R45.4 IRRITABILITY: ICD-10-CM

## 2024-09-16 DIAGNOSIS — Z00.00 PREVENTATIVE HEALTH CARE: ICD-10-CM

## 2024-09-16 DIAGNOSIS — R73.09 ELEVATED HEMOGLOBIN A1C: ICD-10-CM

## 2024-09-16 DIAGNOSIS — E78.5 DYSLIPIDEMIA: Primary | ICD-10-CM

## 2024-09-16 DIAGNOSIS — G47.09 OTHER INSOMNIA: ICD-10-CM

## 2024-09-16 DIAGNOSIS — E66.01 CLASS 3 SEVERE OBESITY DUE TO EXCESS CALORIES WITH SERIOUS COMORBIDITY AND BODY MASS INDEX (BMI) OF 40.0 TO 44.9 IN ADULT: ICD-10-CM

## 2024-09-16 DIAGNOSIS — R79.89 ELEVATED TSH: ICD-10-CM

## 2024-09-16 PROBLEM — E66.813 CLASS 3 SEVERE OBESITY DUE TO EXCESS CALORIES WITH SERIOUS COMORBIDITY AND BODY MASS INDEX (BMI) OF 40.0 TO 44.9 IN ADULT: Status: ACTIVE | Noted: 2024-09-16

## 2024-09-16 PROCEDURE — 99214 OFFICE O/P EST MOD 30 MIN: CPT | Performed by: NURSE PRACTITIONER

## 2024-09-16 RX ORDER — TADALAFIL 5 MG/1
5 TABLET ORAL DAILY PRN
Qty: 90 TABLET | Refills: 1 | Status: SHIPPED | OUTPATIENT
Start: 2024-09-16

## 2024-09-16 RX ORDER — HYDROXYZINE HYDROCHLORIDE 50 MG/1
50 TABLET, FILM COATED ORAL 3 TIMES DAILY PRN
Qty: 14 TABLET | Refills: 0 | Status: SHIPPED | OUTPATIENT
Start: 2024-09-16

## 2024-09-16 RX ORDER — DESVENLAFAXINE 100 MG/1
100 TABLET, EXTENDED RELEASE ORAL DAILY
Qty: 90 TABLET | Refills: 1 | Status: SHIPPED | OUTPATIENT
Start: 2024-09-16

## 2024-09-16 RX ORDER — AMLODIPINE BESYLATE 5 MG/1
5 TABLET ORAL
Qty: 30 TABLET | Refills: 2 | Status: SHIPPED | OUTPATIENT
Start: 2024-09-16

## 2024-10-14 RX ORDER — HYDROXYZINE HYDROCHLORIDE 50 MG/1
50 TABLET, FILM COATED ORAL 3 TIMES DAILY PRN
Qty: 14 TABLET | Refills: 0 | Status: CANCELLED | OUTPATIENT
Start: 2024-10-14

## 2024-10-15 RX ORDER — HYDROXYZINE HYDROCHLORIDE 50 MG/1
50 TABLET, FILM COATED ORAL 3 TIMES DAILY PRN
Qty: 14 TABLET | Refills: 0 | Status: SHIPPED | OUTPATIENT
Start: 2024-10-15

## 2024-12-18 RX ORDER — AMLODIPINE BESYLATE 5 MG/1
5 TABLET ORAL
Qty: 30 TABLET | Refills: 2 | Status: SHIPPED | OUTPATIENT
Start: 2024-12-18

## 2025-03-17 RX ORDER — AMLODIPINE BESYLATE 5 MG/1
5 TABLET ORAL
Qty: 30 TABLET | Refills: 2 | Status: CANCELLED | OUTPATIENT
Start: 2025-03-17

## 2025-03-17 RX ORDER — DESVENLAFAXINE 100 MG/1
100 TABLET, EXTENDED RELEASE ORAL DAILY
Qty: 90 TABLET | Refills: 1 | Status: CANCELLED | OUTPATIENT
Start: 2025-03-17

## 2025-03-18 ENCOUNTER — PATIENT MESSAGE (OUTPATIENT)
Dept: FAMILY MEDICINE CLINIC | Facility: CLINIC | Age: 50
End: 2025-03-18
Payer: COMMERCIAL

## 2025-03-18 RX ORDER — AMLODIPINE BESYLATE 5 MG/1
5 TABLET ORAL
Qty: 30 TABLET | Refills: 2 | Status: SHIPPED | OUTPATIENT
Start: 2025-03-18

## 2025-03-20 DIAGNOSIS — M25.551 RIGHT HIP PAIN: ICD-10-CM

## 2025-03-20 DIAGNOSIS — M87.051 AVASCULAR NECROSIS OF BONE OF HIP, RIGHT: ICD-10-CM

## 2025-03-20 RX ORDER — IBUPROFEN 800 MG/1
800 TABLET, FILM COATED ORAL EVERY 8 HOURS PRN
Qty: 180 TABLET | Refills: 1 | Status: SHIPPED | OUTPATIENT
Start: 2025-03-20

## 2025-03-27 ENCOUNTER — OFFICE VISIT (OUTPATIENT)
Dept: FAMILY MEDICINE CLINIC | Facility: CLINIC | Age: 50
End: 2025-03-27
Payer: COMMERCIAL

## 2025-03-27 VITALS
SYSTOLIC BLOOD PRESSURE: 122 MMHG | TEMPERATURE: 97.8 F | DIASTOLIC BLOOD PRESSURE: 76 MMHG | WEIGHT: 249 LBS | HEART RATE: 89 BPM | HEIGHT: 67 IN | OXYGEN SATURATION: 98 % | BODY MASS INDEX: 39.08 KG/M2

## 2025-03-27 DIAGNOSIS — Z00.00 PREVENTATIVE HEALTH CARE: ICD-10-CM

## 2025-03-27 DIAGNOSIS — R73.03 PREDIABETES: ICD-10-CM

## 2025-03-27 DIAGNOSIS — E66.812 CLASS 2 SEVERE OBESITY DUE TO EXCESS CALORIES WITH SERIOUS COMORBIDITY AND BODY MASS INDEX (BMI) OF 39.0 TO 39.9 IN ADULT: ICD-10-CM

## 2025-03-27 DIAGNOSIS — E78.5 DYSLIPIDEMIA: Primary | ICD-10-CM

## 2025-03-27 DIAGNOSIS — E66.01 CLASS 2 SEVERE OBESITY DUE TO EXCESS CALORIES WITH SERIOUS COMORBIDITY AND BODY MASS INDEX (BMI) OF 39.0 TO 39.9 IN ADULT: ICD-10-CM

## 2025-03-27 DIAGNOSIS — E03.9 ACQUIRED HYPOTHYROIDISM: ICD-10-CM

## 2025-03-27 RX ORDER — TADALAFIL 5 MG/1
5 TABLET ORAL DAILY PRN
Qty: 90 TABLET | Refills: 1 | Status: SHIPPED | OUTPATIENT
Start: 2025-03-27

## 2025-03-27 RX ORDER — DESVENLAFAXINE 100 MG/1
100 TABLET, EXTENDED RELEASE ORAL DAILY
Qty: 90 TABLET | Refills: 1 | Status: SHIPPED | OUTPATIENT
Start: 2025-03-27

## 2025-03-27 RX ORDER — LEVOTHYROXINE SODIUM 50 UG/1
50 TABLET ORAL DAILY
Qty: 90 TABLET | Refills: 1 | Status: SHIPPED | OUTPATIENT
Start: 2025-03-27

## 2025-03-27 NOTE — PROGRESS NOTES
"    Chris Thomas Jr. is a 49 y.o. male.     History of Present Illness  49-year-old white male history hypertension, anxiety, hyperlipidemia, low back pain, prediabetes who comes in today for 6-month follow-up visit fasting blood work    Blood pressure 122/76 heart rate 88 he denies any chest pain, dyspnea, tachycardia or dizziness    Patient has no new complaints.  He just had a baby girl that's 12 weeks old.  For that reason he is not sleeping very well but other than that no problems.  Weight is down 7 pounds at 249 for BMI of 39.  He has had 2 COVID vaccines still need to schedule an eye exam.  PSA is due in September 2025          Fasting blood work  Diet and exercise  Follow-up 6 months       The following portions of the patient's history were reviewed and updated as appropriate: allergies, current medications, past family history, past medical history, past social history, past surgical history, and problem list.    Vitals:    03/27/25 1356   BP: 122/76   BP Location: Left arm   Patient Position: Sitting   Cuff Size: Large Adult   Pulse: 89   Temp: 97.8 °F (36.6 °C)   TempSrc: Temporal   SpO2: 98%   Weight: 113 kg (249 lb)   Height: 170.2 cm (67\")       Past Medical History:   Diagnosis Date    At risk for sleep apnea     STOP BANG 5    ED (erectile dysfunction)     Environmental allergies     tested in youth, no prior immunotherapy    Erectile dysfunction     Fracture, clavicle 1984    Broken collar bone as a child    Hip arthrosis 10/2022    This is the reason for my visit    Hypertension     MEDS IN THE PAST WITH WT LOSS.    Hypothyroidism     Low back pain 1/1/1998    Back injury.  Sporadic pain from excessive activity    Low back strain 1995    Work injury - [employer]    Low testosterone     Obesity 1/1/2000    SARAH (obstructive sleep apnea)     resolved after uvulectomy and T & A in 2005    Osgood-Schlatter's disease 1994    Never officially diagnosed. But was very symptomatic    Primary osteoarthritis of " left knee     Testosterone deficiency     Vitamin D deficiency      Past Surgical History:   Procedure Laterality Date    ADENOIDECTOMY  2004    TONSILLECTOMY AND ADENOIDECTOMY  2005    Dr. Martinez, done for sleep apnea    TOTAL HIP ARTHROPLASTY Right 03/11/2024    Procedure: TOTAL HIP ARTHROPLASTY ANTERIOR WITH HANA TABLE;  Surgeon: Kasi Dawson II, MD;  Location: [facility] OR [facility] [place];  Service: Orthopedics;  Laterality: Right;    UVULECTOMY  2005    for SARAH     Family History   Problem Relation Age of Onset    Hypertension Mother     Hyperlipidemia Mother     Colon cancer Father         dx'd at 62    Hypertension Father     Cancer Father         Colon cancer    Hyperlipidemia Father     Hypertension Sister     No Known Problems Son     Alcohol abuse Maternal Uncle     Diabetes Maternal Grandmother     Arthritis Maternal Grandmother     Stroke Maternal Grandfather     COPD Paternal Grandmother     Stroke Paternal Grandfather     Heart disease Paternal Grandfather     Lung disease Neg Hx     Malig Hyperthermia Neg Hx      Immunization History   Administered Date(s) Administered    COVID-19 (PFIZER) Purple Cap Monovalent 01/01/2021, 01/22/2021    Flu Vaccine Quad PF >36MO 10/11/2017    Fluzone (or Fluarix & Flulaval for VFC) >6mos 11/13/2021, 11/04/2022, 11/14/2023    Fluzone Quad >6mos (Multi-dose) 09/03/2018, 09/01/2019    Hepatitis A 04/20/2018, 10/22/2018    Influenza recombinant 11/23/2024    Influenza, Unspecified 11/01/2023    Tdap 12/17/2018, 10/09/2019       Results Encounter on 09/21/2024   Component Date Value Ref Range Status    WBC 09/27/2024 6.7  3.4 - 10.8 x10E3/uL Final    RBC 09/27/2024 5.13  4.14 - 5.80 x10E6/uL Final    Hemoglobin 09/27/2024 14.7  13.0 - 17.7 g/dL Final    Hematocrit 09/27/2024 45.6  37.5 - 51.0 % Final    MCV 09/27/2024 89  79 - 97 fL Final    MCH 09/27/2024 28.7  26.6 - 33.0 pg Final    MCHC 09/27/2024 32.2  31.5 - 35.7 g/dL Final    RDW 09/27/2024 14.3  11.6 - 15.4 % Final     Platelets 09/27/2024 194  150 - 450 x10E3/uL Final    Neutrophil Rel % 09/27/2024 65  Not Estab. % Final    Lymphocyte Rel % 09/27/2024 28  Not Estab. % Final    Monocyte Rel % 09/27/2024 6  Not Estab. % Final    Eosinophil Rel % 09/27/2024 1  Not Estab. % Final    Basophil Rel % 09/27/2024 0  Not Estab. % Final    Neutrophils Absolute 09/27/2024 4.4  1.4 - 7.0 x10E3/uL Final    Lymphocytes Absolute 09/27/2024 1.9  0.7 - 3.1 x10E3/uL Final    Monocytes Absolute 09/27/2024 0.4  0.1 - 0.9 x10E3/uL Final    Eosinophils Absolute 09/27/2024 0.1  0.0 - 0.4 x10E3/uL Final    Basophils Absolute 09/27/2024 0.0  0.0 - 0.2 x10E3/uL Final    Immature Granulocyte Rel % 09/27/2024 0  Not Estab. % Final    Immature Grans Absolute 09/27/2024 0.0  0.0 - 0.1 x10E3/uL Final    Glucose 09/27/2024 107 (H)  70 - 99 mg/dL Final    BUN 09/27/2024 14  6 - 24 mg/dL Final    Creatinine 09/27/2024 0.96  0.76 - 1.27 mg/dL Final    EGFR Result 09/27/2024 99  >59 mL/min/1.73 Final    BUN/Creatinine Ratio 09/27/2024 15  9 - 20 Final    Sodium 09/27/2024 139  134 - 144 mmol/L Final    Potassium 09/27/2024 4.4  3.5 - 5.2 mmol/L Final    Chloride 09/27/2024 101  96 - 106 mmol/L Final    Total CO2 09/27/2024 22  20 - 29 mmol/L Final    Calcium 09/27/2024 9.2  8.7 - 10.2 mg/dL Final    Total Protein 09/27/2024 6.8  6.0 - 8.5 g/dL Final    Albumin 09/27/2024 4.3  4.1 - 5.1 g/dL Final    Globulin 09/27/2024 2.5  1.5 - 4.5 g/dL Final    Total Bilirubin 09/27/2024 0.4  0.0 - 1.2 mg/dL Final    Alkaline Phosphatase 09/27/2024 93  44 - 121 IU/L Final    AST (SGOT) 09/27/2024 15  0 - 40 IU/L Final    ALT (SGPT) 09/27/2024 25  0 - 44 IU/L Final    Total Cholesterol 09/27/2024 169  100 - 199 mg/dL Final    Triglycerides 09/27/2024 136  0 - 149 mg/dL Final    HDL Cholesterol 09/27/2024 31 (L)  >39 mg/dL Final    VLDL Cholesterol Daniele 09/27/2024 25  5 - 40 mg/dL Final    LDL Chol Calc (NIH) 09/27/2024 113 (H)  0 - 99 mg/dL Final    LDL/HDL RATIO 09/27/2024 3.6   0.0 - 3.6 ratio Final    Comment:                                     LDL/HDL Ratio                                              Men  Women                                1/2 Avg.Risk  1.0    1.5                                    Avg.Risk  3.6    3.2                                 2X Avg.Risk  6.2    5.0                                 3X Avg.Risk  8.0    6.1      Hemoglobin A1C 09/27/2024 5.6  4.8 - 5.6 % Final    Comment:          Prediabetes: 5.7 - 6.4           Diabetes: >6.4           Glycemic control for adults with diabetes: <7.0      TSH 09/27/2024 3.190  0.450 - 4.500 uIU/mL Final    Free T4 09/27/2024 1.15  0.82 - 1.77 ng/dL Final    T3, Total 09/27/2024 120  71 - 180 ng/dL Final    PSA 09/27/2024 0.7  0.0 - 4.0 ng/mL Final    Comment: Roche ECLIA methodology.  According to the American Urological Association, Serum PSA should  decrease and remain at undetectable levels after radical  prostatectomy. The AUA defines biochemical recurrence as an initial  PSA value 0.2 ng/mL or greater followed by a subsequent confirmatory  PSA value 0.2 ng/mL or greater.  Values obtained with different assay methods or kits cannot be used  interchangeably. Results cannot be interpreted as absolute evidence  of the presence or absence of malignant disease.           Review of Systems   Constitutional: Negative.    HENT: Negative.     Respiratory: Negative.     Cardiovascular: Negative.    Gastrointestinal: Negative.    Genitourinary: Negative.    Musculoskeletal: Negative.    Skin: Negative.    Neurological: Negative.    Psychiatric/Behavioral: Negative.         Objective   Physical Exam  Constitutional:       Appearance: Normal appearance.   HENT:      Head: Normocephalic.   Cardiovascular:      Rate and Rhythm: Normal rate and regular rhythm.      Pulses: Normal pulses.      Heart sounds: Normal heart sounds.   Pulmonary:      Effort: Pulmonary effort is normal.      Breath sounds: Normal breath sounds.   Abdominal:       General: Bowel sounds are normal.   Musculoskeletal:         General: Normal range of motion.   Skin:     General: Skin is warm.   Neurological:      General: No focal deficit present.      Mental Status: He is alert and oriented to person, place, and time.   Psychiatric:         Mood and Affect: Mood normal.         Behavior: Behavior normal.         Procedures    Assessment & Plan   Diagnoses and all orders for this visit:    1. Dyslipidemia (Primary)  -     Lipid Panel With LDL / HDL Ratio; Future    2. Prediabetes  -     Comprehensive Metabolic Panel; Future  -     Hemoglobin A1c; Future    3. Acquired hypothyroidism  -     TSH+Free T4; Future  -     T3; Future  -     levothyroxine (Synthroid) 50 MCG tablet; Take 1 tablet by mouth Daily.  Dispense: 90 tablet; Refill: 1    4. Preventative health care  -     CBC & Differential; Future    Other orders  -     desvenlafaxine (Pristiq) 100 MG 24 hr tablet; Take 1 tablet by mouth Daily.  Dispense: 90 tablet; Refill: 1  -     tadalafil (CIALIS) 5 MG tablet; Take 1 tablet by mouth Daily As Needed for erectile dysfunction.  Dispense: 90 tablet; Refill: 1           Current Outpatient Medications:     amLODIPine (NORVASC) 5 MG tablet, Take 1 tablet by mouth every night at bedtime., Disp: 30 tablet, Rfl: 2    desvenlafaxine (Pristiq) 100 MG 24 hr tablet, Take 1 tablet by mouth Daily., Disp: 90 tablet, Rfl: 1    ibuprofen (ADVIL,MOTRIN) 800 MG tablet, Take 1 tablet by mouth Every 8 (Eight) Hours As Needed for Mild Pain., Disp: 180 tablet, Rfl: 1    levothyroxine (Synthroid) 50 MCG tablet, Take 1 tablet by mouth Daily., Disp: 90 tablet, Rfl: 1    tadalafil (CIALIS) 5 MG tablet, Take 1 tablet by mouth Daily As Needed for erectile dysfunction., Disp: 90 tablet, Rfl: 1           Jo-Ann Quezada, APRN 3/27/2025 14:10 EDT  This note has been electronically signed

## 2025-06-23 RX ORDER — AMLODIPINE BESYLATE 5 MG/1
5 TABLET ORAL
Qty: 30 TABLET | Refills: 2 | Status: SHIPPED | OUTPATIENT
Start: 2025-06-23

## (undated) DEVICE — GLV SURG SIGNATURE ESSENTIAL PF LTX SZ8.5

## (undated) DEVICE — 450 ML BOTTLE OF 0.05% CHLORHEXIDINE GLUCONATE IN 99.95% STERILE WATER FOR IRRIGATION, USP AND APPLICATOR.: Brand: IRRISEPT ANTIMICROBIAL WOUND LAVAGE

## (undated) DEVICE — ZIP 16 SURGICAL SKIN CLOSURE DEVICE, PSA: Brand: ZIP 16 SURGICAL SKIN CLOSURE DEVICE

## (undated) DEVICE — MAT FLR ABSORBENT LG 4FT 10 2.5FT

## (undated) DEVICE — TBG PENCL TELESCP MEGADYNE SMOKE EVAC 10FT

## (undated) DEVICE — APPL CHLORAPREP HI/LITE 26ML ORNG

## (undated) DEVICE — 3M™ IOBAN™ 2 ANTIMICROBIAL INCISE DRAPE 6640EZ: Brand: IOBAN™ 2

## (undated) DEVICE — THE STERILE LIGHT HANDLE COVER IS USED WITH STERIS SURGICAL LIGHTING AND VISUALIZATION SYSTEMS.

## (undated) DEVICE — SOL ISO/ALC 70PCT 4OZ

## (undated) DEVICE — SUT ETHIB 2 CV V37 MS/4 30IN MX69G

## (undated) DEVICE — NEEDLE, QUINCKE, 20GX3.5": Brand: MEDLINE

## (undated) DEVICE — PK ANT HIP 40

## (undated) DEVICE — PATIENT RETURN ELECTRODE, SINGLE-USE, CONTACT QUALITY MONITORING, ADULT, WITH 9FT CORD, FOR PATIENTS WEIGING OVER 33LBS. (15KG): Brand: MEGADYNE

## (undated) DEVICE — RECIPROCATING BLADE HEAVY DUTY LONG, OFFSET  (77.6 X 0.77 X 11.2MM)

## (undated) DEVICE — GLV SURG PREMIERPRO ORTHO LTX PF SZ8.5 BRN

## (undated) DEVICE — SOL NACL 0.9PCT 100ML SGL

## (undated) DEVICE — TRAP FLD MINIVAC MEGADYNE 100ML